# Patient Record
Sex: FEMALE | Race: BLACK OR AFRICAN AMERICAN | NOT HISPANIC OR LATINO | RURAL
[De-identification: names, ages, dates, MRNs, and addresses within clinical notes are randomized per-mention and may not be internally consistent; named-entity substitution may affect disease eponyms.]

---

## 2018-05-26 ENCOUNTER — OFFICE VISIT (OUTPATIENT)
Dept: RETAIL CLINIC | Facility: CLINIC | Age: 33
End: 2018-05-26

## 2018-05-26 VITALS
WEIGHT: 293 LBS | HEART RATE: 111 BPM | HEIGHT: 70 IN | RESPIRATION RATE: 14 BRPM | TEMPERATURE: 97.3 F | BODY MASS INDEX: 41.95 KG/M2 | OXYGEN SATURATION: 99 %

## 2018-05-26 DIAGNOSIS — J06.9 VIRAL UPPER RESPIRATORY TRACT INFECTION: Primary | ICD-10-CM

## 2018-05-26 PROCEDURE — 99203 OFFICE O/P NEW LOW 30 MIN: CPT | Performed by: NURSE PRACTITIONER

## 2018-05-26 RX ORDER — IBUPROFEN 800 MG/1
800 TABLET ORAL EVERY 6 HOURS PRN
Qty: 30 TABLET | Refills: 0 | Status: SHIPPED | OUTPATIENT
Start: 2018-05-26 | End: 2021-09-01

## 2018-05-26 RX ORDER — LISINOPRIL AND HYDROCHLOROTHIAZIDE 20; 12.5 MG/1; MG/1
1 TABLET ORAL 2 TIMES DAILY
COMMUNITY
End: 2023-03-07

## 2018-05-26 RX ORDER — PSEUDOEPHEDRINE HCL 120 MG/1
120 TABLET, FILM COATED, EXTENDED RELEASE ORAL EVERY 12 HOURS
Qty: 30 TABLET | Refills: 0 | Status: SHIPPED | OUTPATIENT
Start: 2018-05-26 | End: 2021-09-01

## 2018-05-26 RX ORDER — DEXTROMETHORPHAN HYDROBROMIDE AND PROMETHAZINE HYDROCHLORIDE 15; 6.25 MG/5ML; MG/5ML
5 SYRUP ORAL 4 TIMES DAILY PRN
Qty: 240 ML | Refills: 0 | Status: SHIPPED | OUTPATIENT
Start: 2018-05-26 | End: 2018-06-02

## 2018-05-26 NOTE — PROGRESS NOTES
Subjective   Jailyn Jalloh is a 32 y.o. female.   Chief Complaint   Patient presents with   • Cough   • Ear Drainage   • Sore Throat      31 yo female presents with complaint of throat raw and aching, right ear ache, and cough duration of 2 days.  No fever.  See ros for additional information.      Cough   This is a new problem. The current episode started yesterday. The problem has been gradually worsening. The problem occurs constantly. The cough is non-productive. Associated symptoms include ear pain, postnasal drip and a sore throat. Pertinent negatives include no chills, fever, rhinorrhea, shortness of breath or wheezing. Exacerbated by: exertion. She has tried nothing for the symptoms. The treatment provided no relief.        The following portions of the patient's history were reviewed and updated as appropriate: allergies, current medications, past family history, past medical history, past social history, past surgical history and problem list.    Current Outpatient Prescriptions:   •  lisinopril-hydrochlorothiazide (PRINZIDE,ZESTORETIC) 20-12.5 MG per tablet, Take 1 tablet by mouth Daily., Disp: , Rfl:   •  ibuprofen (ADVIL,MOTRIN) 800 MG tablet, Take 1 tablet by mouth Every 6 (Six) Hours As Needed for Moderate Pain , Fever or Headache., Disp: 30 tablet, Rfl: 0  •  promethazine-dextromethorphan (PROMETHAZINE-DM) 6.25-15 MG/5ML syrup, Take 5 mL by mouth 4 (Four) Times a Day As Needed for Cough for up to 7 days., Disp: 240 mL, Rfl: 0  •  pseudoephedrine (SUDAFED 12 HOUR) 120 MG 12 hr tablet, Take 1 tablet by mouth Every 12 (Twelve) Hours., Disp: 30 tablet, Rfl: 0    Review of Systems   Constitutional: Positive for activity change and fatigue. Negative for appetite change, chills and fever.   HENT: Positive for ear pain, postnasal drip and sore throat. Negative for congestion, ear discharge, rhinorrhea, sinus pain, sinus pressure and sneezing.    Eyes: Negative.    Respiratory: Positive for cough.  "Negative for apnea, choking, chest tightness, shortness of breath, wheezing and stridor.    Cardiovascular: Negative.    Musculoskeletal: Negative.    Skin: Negative.    Psychiatric/Behavioral: Negative.      Pulse 111   Temp 97.3 °F (36.3 °C) (Temporal Artery )   Resp 14   Ht 177.8 cm (70\")   Wt (!) 137 kg (301 lb)   LMP 04/30/2018   SpO2 99%   BMI 43.19 kg/m²     Objective   No Known Allergies    Physical Exam   Constitutional: She is oriented to person, place, and time. She appears well-developed and well-nourished. No distress.   HENT:   Head: Normocephalic.   Right Ear: External ear normal. Tympanic membrane is scarred and bulging. Tympanic membrane is not erythematous. A middle ear effusion is present.   Left Ear: Hearing, external ear and ear canal normal. Tympanic membrane is scarred.   Nose: Mucosal edema present. Right sinus exhibits no maxillary sinus tenderness and no frontal sinus tenderness. Left sinus exhibits no maxillary sinus tenderness and no frontal sinus tenderness.   Mouth/Throat: Uvula is midline, oropharynx is clear and moist and mucous membranes are normal. Tonsils are 0 on the right. Tonsils are 0 on the left. No tonsillar exudate.   Eyes: Conjunctivae are normal.   Neck: Normal range of motion. Neck supple. No JVD present. No tracheal deviation present. No thyromegaly present.   Cardiovascular: Normal rate, regular rhythm and normal heart sounds.    Pulmonary/Chest: Effort normal and breath sounds normal. No stridor. No respiratory distress. She has no wheezes. She has no rales. She exhibits no tenderness.   Lymphadenopathy:     She has no cervical adenopathy.   Neurological: She is alert and oriented to person, place, and time.   Skin: Skin is warm. Capillary refill takes less than 2 seconds. No rash noted. She is not diaphoretic. No erythema. No pallor.   Psychiatric: She has a normal mood and affect. Her behavior is normal.   Vitals reviewed.      Assessment/Plan   Jailyn was " seen today for cough, ear drainage and sore throat.    Diagnoses and all orders for this visit:    Viral upper respiratory tract infection    Other orders  -     pseudoephedrine (SUDAFED 12 HOUR) 120 MG 12 hr tablet; Take 1 tablet by mouth Every 12 (Twelve) Hours.  -     ibuprofen (ADVIL,MOTRIN) 800 MG tablet; Take 1 tablet by mouth Every 6 (Six) Hours As Needed for Moderate Pain , Fever or Headache.  -     promethazine-dextromethorphan (PROMETHAZINE-DM) 6.25-15 MG/5ML syrup; Take 5 mL by mouth 4 (Four) Times a Day As Needed for Cough for up to 7 days.           An After Visit Summary was printed, reviewed, and given to the patient. Understanding verbalized and agrees with treatment plan.  If no improvement or becomes worse, follow up with primary or go to [facility] [place].          May 26, 2018 11:47 AM

## 2021-09-01 ENCOUNTER — OFFICE VISIT (OUTPATIENT)
Dept: OBSTETRICS AND GYNECOLOGY | Facility: CLINIC | Age: 36
End: 2021-09-01

## 2021-09-01 VITALS
BODY MASS INDEX: 41.02 KG/M2 | DIASTOLIC BLOOD PRESSURE: 80 MMHG | WEIGHT: 293 LBS | SYSTOLIC BLOOD PRESSURE: 132 MMHG | HEIGHT: 71 IN

## 2021-09-01 DIAGNOSIS — R82.90 BAD ODOR OF URINE: ICD-10-CM

## 2021-09-01 DIAGNOSIS — Z01.419 ENCOUNTER FOR GYNECOLOGICAL EXAMINATION: ICD-10-CM

## 2021-09-01 DIAGNOSIS — D25.9 UTERINE LEIOMYOMA, UNSPECIFIED LOCATION: Primary | ICD-10-CM

## 2021-09-01 LAB
BILIRUB BLD-MCNC: NEGATIVE MG/DL
GLUCOSE UR STRIP-MCNC: NEGATIVE MG/DL
KETONES UR QL: NEGATIVE
LEUKOCYTE EST, POC: NEGATIVE
NITRITE UR-MCNC: NEGATIVE MG/ML
PH UR: 6 [PH] (ref 5–8)
PROT UR STRIP-MCNC: NEGATIVE MG/DL
RBC # UR STRIP: NEGATIVE /UL
SP GR UR: 1.02 (ref 1–1.03)
UROBILINOGEN UR QL: NORMAL

## 2021-09-01 PROCEDURE — 99385 PREV VISIT NEW AGE 18-39: CPT | Performed by: NURSE PRACTITIONER

## 2021-09-01 PROCEDURE — 81002 URINALYSIS NONAUTO W/O SCOPE: CPT | Performed by: NURSE PRACTITIONER

## 2021-09-01 RX ORDER — FERROUS SULFATE 325(65) MG
TABLET ORAL
COMMUNITY
Start: 2021-08-22

## 2021-09-01 RX ORDER — OMEPRAZOLE 20 MG/1
20 CAPSULE, DELAYED RELEASE ORAL DAILY
COMMUNITY
End: 2023-02-22

## 2021-09-01 RX ORDER — ERGOCALCIFEROL 1.25 MG/1
50000 CAPSULE ORAL
COMMUNITY
Start: 2021-08-22

## 2021-09-01 NOTE — PROGRESS NOTES
GYN Annual Exam     CC - Here for annual exam.        HPI  Jailyn Jalloh is a 36 y.o. female, , who presents for annual well woman exam. Patient's last menstrual period was 2021..  Periods are regular every 25-35 days, lasting 4 days. .  Dysmenorrhea:severe, occurring first 1-2 days of flow.  Patient reports problems with: none.  Pt reports urine having a bad odor. Partner Status: Marital Status: single.  She is sexually active. No concerns of STD's.  Hx fibroids; US 2020= 1 cm and 2 cm    Additional OB/GYN History   Current contraception: contraceptive methods: Tubal ligation    Last Pap :   Last Completed Pap Smear          Ordered - PAP SMEAR (Every 3 Years) Ordered on 2020  Done - in [place] (negative)              History of abnormal Pap smear: no  Family history of uterine, colon, breast, or ovarian cancer: no  Performs monthly Self-Breast Exam: yes  Exercises Regularly:no  Feelings of Anxiety or Depression: no  Tobacco Usage?: No   OB History        2    Para   2    Term   2            AB        Living   2       SAB        TAB        Ectopic        Molar        Multiple        Live Births   2                Health Maintenance   Topic Date Due   • Annual Gynecologic Pelvic and Breast Exam  Never done   • ANNUAL PHYSICAL  Never done   • COVID-19 Vaccine (1) Never done   • HEPATITIS C SCREENING  Never done   • INFLUENZA VACCINE  10/01/2021   • PAP SMEAR  2023   • TDAP/TD VACCINES (4 - Td or Tdap) 2029   • Pneumococcal Vaccine 0-64  Aged Out       The additional following portions of the patient's history were reviewed and updated as appropriate: allergies, current medications, past family history, past medical history, past social history, past surgical history and problem list.    Review of Systems   All other systems reviewed and are negative.        I have reviewed and agree with the HPI, ROS, and historical information as entered above. Naheed Ferris  "Tahir, APRN    Objective   /80 (BP Location: Left arm, Patient Position: Sitting, Cuff Size: Large Adult)   Ht 180.3 cm (71\")   Wt (!) 140 kg (308 lb)   LMP 08/13/2021   Breastfeeding No   BMI 42.96 kg/m²     Physical Exam  Vitals and nursing note reviewed. Exam conducted with a chaperone present.   Constitutional:       General: She is not in acute distress.     Appearance: Normal appearance. She is well-developed. She is not ill-appearing.   HENT:      Head: Normocephalic and atraumatic.   Neck:      Thyroid: No thyroid mass or thyromegaly.   Cardiovascular:      Heart sounds: No murmur heard.     Pulmonary:      Effort: Pulmonary effort is normal. No retractions.   Chest:      Chest wall: No mass.      Breasts:         Right: Normal. No mass, nipple discharge, skin change or tenderness.         Left: Normal. No mass, nipple discharge, skin change or tenderness.   Abdominal:      Palpations: Abdomen is soft. Abdomen is not rigid. There is no mass.      Tenderness: There is no abdominal tenderness. There is no guarding.      Hernia: No hernia is present. There is no hernia in the left inguinal area.   Genitourinary:     General: Normal vulva.      Labia:         Right: No rash, tenderness or lesion.         Left: No rash, tenderness or lesion.       Vagina: Normal. No vaginal discharge or lesions.      Cervix: Normal.      Uterus: Normal. Not enlarged, not fixed and not tender.       Adnexa: Right adnexa normal and left adnexa normal.        Right: No mass or tenderness.          Left: No mass or tenderness.        Rectum: No external hemorrhoid.   Musculoskeletal:      Cervical back: Normal range of motion. No muscular tenderness.   Skin:     General: Skin is warm and dry.   Neurological:      Mental Status: She is alert and oriented to person, place, and time.   Psychiatric:         Mood and Affect: Mood normal.         Behavior: Behavior normal.            Assessment and Plan    Problem List Items " Addressed This Visit     None      Visit Diagnoses     Uterine leiomyoma, unspecified location    -  Primary    Relevant Orders    US Non-ob Transvaginal    Bad odor of urine        Relevant Orders    POC Urinalysis Dipstick (Completed)    Encounter for gynecological examination        Relevant Orders    Pap IG, HPV-hr          1. GYN annual well woman exam.   2. Reviewed monthly self breast exams.  Instructed to call with lumps, pain, or breast discharge.    3. Reviewed exercise as a preventative health measures.   4. Reccommended Flu Vaccine in Fall of each year.  5. RTC in 1 year or PRN with problems  Return in about 1 year (around 9/1/2022) for Annual physical.   7.   US today shows 2 stable fibroids at 1 cm and 2 cm.      Naheed Haro, APRN  09/01/2021

## 2023-02-22 ENCOUNTER — OFFICE VISIT (OUTPATIENT)
Dept: OBSTETRICS AND GYNECOLOGY | Facility: CLINIC | Age: 38
End: 2023-02-22
Payer: COMMERCIAL

## 2023-02-22 VITALS
WEIGHT: 293 LBS | SYSTOLIC BLOOD PRESSURE: 126 MMHG | HEIGHT: 71 IN | BODY MASS INDEX: 41.02 KG/M2 | DIASTOLIC BLOOD PRESSURE: 84 MMHG

## 2023-02-22 DIAGNOSIS — D25.9 UTERINE LEIOMYOMA, UNSPECIFIED LOCATION: ICD-10-CM

## 2023-02-22 DIAGNOSIS — Z01.419 WOMEN'S ANNUAL ROUTINE GYNECOLOGICAL EXAMINATION: Primary | ICD-10-CM

## 2023-02-22 PROCEDURE — 99395 PREV VISIT EST AGE 18-39: CPT | Performed by: NURSE PRACTITIONER

## 2023-02-22 NOTE — PROGRESS NOTES
Gynecologic Annual Exam Note        Gynecologic Exam        Subjective     HPI  Jailyn Jalloh is a 37 y.o.  female who presents for annual well woman exam as a established patient. Since her last visit the patient underwent surgery for gallbladder removal . Patient reports problems with: none. Patient's last menstrual period was 2023 (exact date).. Her periods occur every 25-35 days , lasting 4 days. The flow is moderate to heavy .. She reports dysmenorrhea is moderate, occurring first 1-2 days of flow. Patient reports clots and from going to 2022 til 2022 without a period but had cramps.  Partner Status: Marital Status: single.  She is sexually active. She has not had new partners.. STD testing recommendations have been explained to the patient and she does not desire STD testing.    Hx of uterine fibroids @ 1 and 2 cm which were stable from US in  to .  No changes since.    Additional OB/GYN History   Current contraception: contraceptive methods: Tubal ligation    Thromboembolic Disease: none  Age of menarche: 10    History of STD: HPV and Genital warts in      Last Pap :2021. Results: negative. HPV: negative.   Last Completed Pap Smear          PAP SMEAR (Every 3 Years) Next due on 2021  Pap IG, HPV-hr    2020  Done - in [place] (negative)                 History of abnormal Pap smear: no  Gardasil status:has not had   Family history of uterine, colon, breast, or ovarian cancer: no  Performs monthly Self-Breast Exam: yes  Exercises Regularly:occassional   Feelings of Anxiety or Depression: no  Tobacco Usage?: No       Current Outpatient Medications:   •  FeroSul 325 (65 Fe) MG tablet, TAKE 1 TABLET BY MOUTH EVERY DAY WITH VITAMIN TABLET -500 MG, Disp: , Rfl:   •  lisinopril-hydrochlorothiazide (PRINZIDE,ZESTORETIC) 20-12.5 MG per tablet, Take 1 tablet by mouth Daily., Disp: , Rfl:   •  vitamin D (ERGOCALCIFEROL) 1.25 MG  "(19289 UT) capsule capsule, Take 50,000 Units by mouth Every 7 (Seven) Days., Disp: , Rfl:      Patient denies the need for medication refills today.    OB History        2    Para   2    Term   2            AB        Living   2       SAB        IAB        Ectopic        Molar        Multiple        Live Births   2                Health Maintenance   Topic Date Due   • COVID-19 Vaccine (1) Never done   • HEPATITIS C SCREENING  Never done   • ANNUAL PHYSICAL  Never done   • INFLUENZA VACCINE  2022   • Annual Gynecologic Pelvic and Breast Exam  2022   • PAP SMEAR  2024   • TDAP/TD VACCINES (4 - Td or Tdap) 2029   • Pneumococcal Vaccine 0-64  Aged Out       Past Medical History:   Diagnosis Date   • Gallstones    • GERD (gastroesophageal reflux disease)    • Hypertension    • Iron deficiency anemia         Past Surgical History:   Procedure Laterality Date   •  SECTION     • GALLBLADDER SURGERY      10/21   • LAPAROSCOPIC TUBAL LIGATION     • TONSILLECTOMY         The additional following portions of the patient's history were reviewed and updated as appropriate: allergies, current medications, past family history, past medical history, past social history, past surgical history and problem list.    Review of Systems   All other systems reviewed and are negative.        I have reviewed and agree with the HPI, ROS, and historical information as entered above. Naheed Haro, APRN        Objective   /84   Ht 180.3 cm (71\")   Wt (!) 147 kg (324 lb 9.6 oz)   LMP 2023 (Exact Date)   BMI 45.27 kg/m²     Physical Exam  Vitals and nursing note reviewed. Exam conducted with a chaperone present.   Constitutional:       General: She is not in acute distress.     Appearance: Normal appearance. She is well-developed. She is obese. She is not ill-appearing.   HENT:      Head: Normocephalic and atraumatic.   Neck:      Thyroid: No thyroid mass or thyromegaly.   Pulmonary: "      Effort: Pulmonary effort is normal. No respiratory distress or retractions.   Chest:      Chest wall: No mass.   Breasts:     Right: Normal. No mass, nipple discharge, skin change or tenderness.      Left: Normal. No mass, nipple discharge, skin change or tenderness.   Abdominal:      Palpations: Abdomen is soft. Abdomen is not rigid. There is no mass.      Tenderness: There is no abdominal tenderness. There is no guarding.      Hernia: No hernia is present. There is no hernia in the left inguinal area.   Genitourinary:     General: Normal vulva.      Labia:         Right: No rash, tenderness or lesion.         Left: No rash, tenderness or lesion.       Vagina: Normal. No vaginal discharge or lesions.      Cervix: Normal.      Uterus: Normal. Not enlarged, not fixed and not tender.       Adnexa: Left adnexa normal.        Right: No mass or tenderness.          Left: No mass or tenderness.        Rectum: No external hemorrhoid.   Musculoskeletal:      Cervical back: Normal range of motion. No muscular tenderness.   Skin:     General: Skin is warm and dry.   Neurological:      Mental Status: She is alert and oriented to person, place, and time.   Psychiatric:         Behavior: Behavior normal.            Assessment and Plan    Problem List Items Addressed This Visit    None  Visit Diagnoses     Women's annual routine gynecological examination    -  Primary    Uterine leiomyoma, unspecified location              1. GYN annual well woman exam.   2. Reviewed pap guidelines.   3. Reviewed monthly self breast exams.  Instructed to call with lumps, pain, or breast discharge.    4. Reviewed BMI and weight loss as preventative health measures.   5. Reviewed exercise as a preventative health measures.   6. Reccommended Flu Vaccine in Fall of each year.  7. RTC in 1 year or PRN with problems  Return in about 1 year (around 2/22/2024) for Annual physical.   9.   Will order initial screening mammogram next year.   10. Call  prn pain, AUB for US FU of fibroids.      Naheed Haro, APRN  02/22/2023

## 2023-02-28 PROBLEM — E66.01 MORBID (SEVERE) OBESITY DUE TO EXCESS CALORIES (HCC): Status: ACTIVE | Noted: 2023-02-28

## 2023-02-28 PROBLEM — E01.0 THYROMEGALY: Status: ACTIVE | Noted: 2023-02-28

## 2023-02-28 PROBLEM — I10 PRIMARY HYPERTENSION: Status: ACTIVE | Noted: 2023-02-28

## 2023-02-28 PROBLEM — R94.31 ABNORMAL EKG: Status: ACTIVE | Noted: 2023-02-28

## 2023-02-28 PROBLEM — E55.9 VITAMIN D DEFICIENCY: Status: ACTIVE | Noted: 2023-02-28

## 2023-02-28 PROBLEM — E78.5 DYSLIPIDEMIA: Status: ACTIVE | Noted: 2023-02-28

## 2023-02-28 PROBLEM — D50.9 IRON DEFICIENCY ANEMIA: Status: ACTIVE | Noted: 2023-02-28

## 2023-03-01 ENCOUNTER — OFFICE VISIT (OUTPATIENT)
Dept: FAMILY MEDICINE CLINIC | Facility: CLINIC | Age: 38
End: 2023-03-01
Payer: COMMERCIAL

## 2023-03-01 VITALS
SYSTOLIC BLOOD PRESSURE: 136 MMHG | BODY MASS INDEX: 41.02 KG/M2 | DIASTOLIC BLOOD PRESSURE: 106 MMHG | HEIGHT: 71 IN | WEIGHT: 293 LBS | OXYGEN SATURATION: 99 % | HEART RATE: 86 BPM | TEMPERATURE: 97.8 F

## 2023-03-01 DIAGNOSIS — D50.8 OTHER IRON DEFICIENCY ANEMIA: ICD-10-CM

## 2023-03-01 DIAGNOSIS — I10 PRIMARY HYPERTENSION: Primary | ICD-10-CM

## 2023-03-01 DIAGNOSIS — E55.9 VITAMIN D DEFICIENCY: ICD-10-CM

## 2023-03-01 DIAGNOSIS — E66.01 MORBID (SEVERE) OBESITY DUE TO EXCESS CALORIES: ICD-10-CM

## 2023-03-01 PROCEDURE — 99214 OFFICE O/P EST MOD 30 MIN: CPT | Performed by: INTERNAL MEDICINE

## 2023-03-01 RX ORDER — AMLODIPINE BESYLATE 5 MG/1
5 TABLET ORAL DAILY
Qty: 90 TABLET | Refills: 3 | Status: SHIPPED | OUTPATIENT
Start: 2023-03-01

## 2023-03-01 NOTE — PROGRESS NOTES
Answers for HPI/ROS submitted by the patient on 2023  What is the primary reason for your visit?: Physical        Follow Up Office Visit      Date: 2023   Patient Name: Jailyn Jalloh  : 1985   MRN: [id number]     Chief Complaint:    Chief Complaint   Patient presents with   • Annual Exam     F/u for medication       History of Present Illness: Jailyn Jalloh is a 37 y.o. female who is here today for her first visit is 2022 primarily for review of her hypertension, noting she takes lisinopril/HCTZ 20/12.5 mg twice daily with her blood pressures checked once or twice weekly per her account averaging 170-180/70 90 though she has had blood pressures more in the 140 systolic when she is had medical checkup such as a gynecologist.  She denies headaches dizziness chest pains palpitations dyspnea or edema.  Overall has fairly good energy.  She does have history of iron deficiency anemia which has been felt related to heavy menses, followed regularly by gynecology with no interventions being performed at this time, her menses are regular, lasting 4 to 5 days, known she does have some ovarian cysts but these are felt to be benign.  She does have a BTL for contraception.  She also has a history of vitamin D deficiency on high-dose vitamin D supplementation.  We discussed her obesity, patient indicating that she is started to workout regularly and has made some dietary modifications, but is interested in some assistance with medication to help her lose weight.  She also has inquired as to her eligibility for bariatric surgery through her insurance which is apparently requiring a 6-month documentation of a lifestyle change before authorization of bariatric surgery..    Subjective      Review of Systems:   Review of Systems    I have reviewed the patients family history, social history, past medical history, past surgical history and have updated it as appropriate.     Medications:     Current  "Outpatient Medications:   •  FeroSul 325 (65 Fe) MG tablet, TAKE 1 TABLET BY MOUTH EVERY DAY WITH VITAMIN TABLET -500 MG, Disp: , Rfl:   •  lisinopril-hydrochlorothiazide (PRINZIDE,ZESTORETIC) 20-12.5 MG per tablet, Take 1 tablet by mouth 2 (Two) Times a Day., Disp: , Rfl:   •  vitamin D (ERGOCALCIFEROL) 1.25 MG (47535 UT) capsule capsule, Take 1 capsule by mouth Every 7 (Seven) Days., Disp: , Rfl:   •  amLODIPine (NORVASC) 5 MG tablet, Take 1 tablet by mouth Daily. For blood pressure, Disp: 90 tablet, Rfl: 3  •  Semaglutide-Weight Management 0.25 MG/0.5ML solution auto-injector, Inject 0.25 mg under the skin into the appropriate area as directed Every 7 (Seven) Days. For weight loss, Disp: 2 mL, Rfl: 0    Allergies:   Allergies   Allergen Reactions   • Bactrim [Sulfamethoxazole-Trimethoprim] Rash       Objective     Physical Exam: Please see above  Vital Signs:   Vitals:    03/01/23 1103 03/01/23 1131   BP: 150/98 (!) 136/106   BP Location: Left arm Left arm   Patient Position: Sitting Sitting   Cuff Size: Adult    Pulse: 86    Temp: 97.8 °F (36.6 °C)    TempSrc: Temporal    SpO2: 99%    Weight: (!) 146 kg (322 lb)    Height: 180.3 cm (70.98\")      Body mass index is 44.93 kg/m².  Class 3 Severe Obesity (BMI >=40). Obesity-related health conditions include the following: hypertension. Obesity is unchanged. BMI is is above average; BMI management plan is completed. We discussed low calorie, low carb based diet program, portion control, increasing exercise and pharmacologic options including GLP-1 agonist.       Physical Exam  General: Very pleasant taller statured healthy-appearing 37-year-old female with a BMI 44.9, noting weight down to 2 pounds from a GYN visit several days earlier, but increased 10 pounds from her office visit here 1 year ago  Neck supple with some mild chronic stable symmetric thyromegaly, no nodules or tenderness  Lungs clear  Cardiac regular rate rhythm with no murmurs gallops or rubs, " no dependent edema.  Procedures    Results:   Labs:   No results found for: HGBA1C, CMP, CBCDIFFPANEL, CREAT, TSH     POCT Results (if applicable):   Results for orders placed or performed in visit on 09/01/21   POC Urinalysis Dipstick    Specimen: Urine   Result Value Ref Range    Glucose, UA Negative Negative, 1000 mg/dL (3+) mg/dL    Bilirubin Negative Negative    Ketones, UA Negative Negative    Specific Gravity  1.020 1.005 - 1.030    Blood, UA Negative Negative    pH, Urine 6.0 5.0 - 8.0    Protein, POC Negative Negative mg/dL    Urobilinogen, UA Normal Normal    Leukocytes Negative Negative    Nitrite, UA Negative Negative     Review of testing 5/13/2022:    TSH 3.47, free T4 0.91, both  White count 6.7 with normal differential, H&H 11.7 and 34.3, MCV 90, platelets 300,000, noting previous H&H 11.9 and 34.3 in 3/2021  CMP unremarkable other than chloride 111, albumin 3.3  Total cholesterol 188, triglyceride 120, HDL 56, , TC/HDL ratio 3  Hemoglobin A1c 5.3%  Iron 55, TIBC 390, ferritin 50, all normal  Iron percent saturation 14%, low  B12 708, normal  Folic acid 11.0, normal  Urinalysis normal  Vitamin D 20.4, low      Assessment / Plan      Assessment/Plan:   Diagnoses and all orders for this visit:    1. Primary hypertension (Primary)  -     amLODIPine (NORVASC) 5 MG tablet; Take 1 tablet by mouth Daily. For blood pressure  Dispense: 90 tablet; Refill: 3  Suboptimal control acutely as well as chronically taking lisinopril/HCTZ 20/12.5 mg twice daily.  We discussed aggressive lifestyle changes with diet exercise avoidance of added salt and attempts at weight loss, but also will add amlodipine 5 mg daily, reassessing clinically in 1 month.  Advise if any problems in the interim  2. Morbid (severe) obesity due to excess calories (HCC)  -     Semaglutide-Weight Management 0.25 MG/0.5ML solution auto-injector; Inject 0.25 mg under the skin into the appropriate area as directed Every 7 (Seven) Days. For  weight loss  Dispense: 2 mL; Refill: 0  Longstanding problem, secondary side effect primarily of her hypertension.  Apparently not a candidate for [place] surgery until she goes through a 6-month regimen of lifestyle changes.  She will initiate same with diet and exercise, but you also discussed medication options and she is interested in a trial of a GLP-1 agonist, noting she is an excellent candidate given her high risk of health complications from her obesity, prompting prescription of Wegovy 0.25 mg subcu weekly for 4 weeks at which time we will follow-up and make further titration as tolerated.  Side effect of Wegovy discussed.  3. Other iron deficiency anemia  Previous iron deficiency anemia noted, secondary to heavy menses.  She is on iron supplementation.  We will update pertinent labs when she comes in for complete physical next month.  4. Vitamin D deficiency  On high-dose vitamin D supplementation.  Update level next visit at her complete physical      Follow Up:   Return in about 1 month (around 4/1/2023) for Annual physical.      At [facility], we believe that sharing information builds trust and better relationships. You are receiving this note because you recently visited [facility]. It is possible you will see health information before a provider has talked with you about it. This kind of information can be easy to misunderstand. To help you fully understand what it means for your health, we urge you to discuss this note with your provider.    Pop Rudd MD  [facility]

## 2023-03-02 ENCOUNTER — PATIENT ROUNDING (BHMG ONLY) (OUTPATIENT)
Dept: FAMILY MEDICINE CLINIC | Facility: CLINIC | Age: 38
End: 2023-03-02
Payer: COMMERCIAL

## 2023-03-02 NOTE — PROGRESS NOTES
.A [employer] message has been sent to the patient for patient rounding with [facility].

## 2023-03-07 RX ORDER — LISINOPRIL AND HYDROCHLOROTHIAZIDE 20; 12.5 MG/1; MG/1
TABLET ORAL
Qty: 180 TABLET | Refills: 2 | Status: SHIPPED | OUTPATIENT
Start: 2023-03-07

## 2023-03-07 RX ORDER — LISINOPRIL AND HYDROCHLOROTHIAZIDE 20; 12.5 MG/1; MG/1
TABLET ORAL
Qty: 60 TABLET | Refills: 0 | Status: SHIPPED | OUTPATIENT
Start: 2023-03-07 | End: 2023-03-07

## 2023-03-29 DIAGNOSIS — E66.01 MORBID (SEVERE) OBESITY DUE TO EXCESS CALORIES: ICD-10-CM

## 2023-03-29 NOTE — TELEPHONE ENCOUNTER
Caller: Jailyn Jalloh    Relationship: Self    Best call back number: [phone]    Requested Prescriptions:   Requested Prescriptions     Pending Prescriptions Disp Refills   • Semaglutide-Weight Management 0.25 MG/0.5ML solution auto-injector 2 mL 0     Sig: Inject 0.25 mg under the skin into the appropriate area as directed Every 7 (Seven) Days. For weight loss        Pharmacy where request should be sent: [id number] - [address]  AT [facility] [address] - [phone]  - [phone] FX     Last office visit with prescribing clinician: 3/1/2023   Last telemedicine visit with prescribing clinician: 4/12/2023   Next office visit with prescribing clinician: 4/12/2023     Additional details provided by patient:   PATIENT IS SCHEDULED FOR 04/12/2023 AND WILL RUN OUT OF MEDICATION BEFORE APPOINTMENT WOULD LIKE FOR A REFILL TO BE CALLED INTO THE PHARMACY IS POSSIBLE BEFORE APPOINTMENT     PATIENT WOULD LIKE CALL BACK REGARDING THIS INFORMATION AND TO BE INFORMED       Does the patient have less than a 3 day supply:  [x] Yes  [] No    Would you like a call back once the refill request has been completed: [x] Yes [] No    If the office needs to give you a call back, can they leave a voicemail: [x] Yes [] No    Senthil Banks Rep   03/29/23 14:14 EDT

## 2023-04-26 ENCOUNTER — OFFICE VISIT (OUTPATIENT)
Dept: FAMILY MEDICINE CLINIC | Facility: CLINIC | Age: 38
End: 2023-04-26
Payer: COMMERCIAL

## 2023-04-26 VITALS
WEIGHT: 293 LBS | OXYGEN SATURATION: 98 % | BODY MASS INDEX: 41.02 KG/M2 | TEMPERATURE: 98 F | HEIGHT: 71 IN | DIASTOLIC BLOOD PRESSURE: 86 MMHG | HEART RATE: 101 BPM | SYSTOLIC BLOOD PRESSURE: 116 MMHG

## 2023-04-26 DIAGNOSIS — E55.9 VITAMIN D DEFICIENCY: ICD-10-CM

## 2023-04-26 DIAGNOSIS — E78.5 DYSLIPIDEMIA: ICD-10-CM

## 2023-04-26 DIAGNOSIS — R94.31 ABNORMAL EKG: ICD-10-CM

## 2023-04-26 DIAGNOSIS — Z00.01 ENCOUNTER FOR GENERAL ADULT MEDICAL EXAMINATION WITH ABNORMAL FINDINGS: Primary | ICD-10-CM

## 2023-04-26 DIAGNOSIS — E66.01 MORBID (SEVERE) OBESITY DUE TO EXCESS CALORIES: ICD-10-CM

## 2023-04-26 DIAGNOSIS — Z12.31 ENCOUNTER FOR SCREENING MAMMOGRAM FOR MALIGNANT NEOPLASM OF BREAST: ICD-10-CM

## 2023-04-26 DIAGNOSIS — Z11.59 NEED FOR HEPATITIS C SCREENING TEST: ICD-10-CM

## 2023-04-26 DIAGNOSIS — E01.0 THYROMEGALY: ICD-10-CM

## 2023-04-26 DIAGNOSIS — I10 PRIMARY HYPERTENSION: ICD-10-CM

## 2023-04-26 DIAGNOSIS — D50.8 OTHER IRON DEFICIENCY ANEMIA: ICD-10-CM

## 2023-04-26 PROBLEM — Z92.89 HISTORY OF STRESS TEST: Status: ACTIVE | Noted: 2023-04-26

## 2023-04-26 RX ORDER — SEMAGLUTIDE 0.5 MG/.5ML
0.5 INJECTION, SOLUTION SUBCUTANEOUS
Qty: 2 ML | Refills: 0 | Status: SHIPPED | OUTPATIENT
Start: 2023-04-26

## 2023-04-26 NOTE — PROGRESS NOTES
Venipuncture Blood Specimen Collection  Venipuncture performed in left hand by La Nena Hurley MA with good hemostasis. Patient tolerated the procedure well without complications.   04/26/23   La Nena Hurley MA

## 2023-04-26 NOTE — PROGRESS NOTES
Female Physical Note      Date: 2023   Patient Name: Jailyn Jalloh  : 1985   MRN: [id number]     Chief Complaint:    Chief Complaint   Patient presents with   • Annual Exam       History of Present Illness: Jailyn Jalloh is a 37 y.o. female who is here today for their annual health maintenance and physical.  Patient was last seen in the office almost 2 months ago having been prescribed Wegovy 0.25 mg subcu weekly which she is taking for 8 weeks, noting a reduction in her appetite with no GI or other known side effects, having made significant lifestyle changes working out at the [facility] 3-4 times weekly on an ellipWOWash machine for 1 hour making dietary modifications eliminating sweet drinks junk foods and fast foods and drinking water with plenty of fruits and vegetables.  She has lost 13 pounds in weight in the last 2 months secondarily.  Also last visit had the addition of amlodipine 5 mg daily to her lisinopril/HCTZ 20/12.5 mg twice daily, not checking blood pressures but having no headaches dizziness or cardiopulmonary complaints.  She has a history of vitamin D deficiency on high-dose vitamin D supplementation weekly, also history of previous thyromegaly with normal ultrasound and normal TFTs and normal antithyroglobulin antibody in , having had a thyroid ultrasound in 3/2019 that revealed a tiny colloid cyst in the left lobe otherwise was unremarkable.  She is not aware of any increase in size of her thyroid.  Has a history of iron deficiency anemia felt secondary to heavy menses on iron supplementation, history of an abnormal EKG with some inferior lateral T wave flattening having undergone a normal stress echocardiogram in the last couple years.  Again no chest pains palpitations dyspnea or edema.  History of normal Pap smear and HPV screen from 2021 indicating she saw her gynecologist again in 2023 and is quite certain another Pap smear was obtained though she is unsure of  the results.  No other acute problems or concerns      Subjective      Review of Systems:   Review of Systems    Past Medical History, Social History, Family History and Care Team were all reviewed with patient and updated as appropriate.     Medications:     Current Outpatient Medications:   •  amLODIPine (NORVASC) 5 MG tablet, Take 1 tablet by mouth Daily. For blood pressure, Disp: 90 tablet, Rfl: 3  •  FeroSul 325 (65 Fe) MG tablet, TAKE 1 TABLET BY MOUTH EVERY DAY WITH VITAMIN TABLET -500 MG, Disp: , Rfl:   •  lisinopril-hydrochlorothiazide (PRINZIDE,ZESTORETIC) 20-12.5 MG per tablet, TAKE 1 TABLET BY MOUTH TWICE DAILY, Disp: 180 tablet, Rfl: 2  •  vitamin D (ERGOCALCIFEROL) 1.25 MG (00358 UT) capsule capsule, Take 1 capsule by mouth Every 7 (Seven) Days., Disp: , Rfl:   •  Semaglutide-Weight Management (Wegovy) 0.5 MG/0.5ML solution auto-injector, Inject 0.5 mL under the skin into the appropriate area as directed Every 7 (Seven) Days., Disp: 2 mL, Rfl: 0    Allergies:   Allergies   Allergen Reactions   • Bactrim [Sulfamethoxazole-Trimethoprim] Rash       Immunizations:  Health Maintenance Summary          Ordered - HEPATITIS C SCREENING (Once) Ordered on 4/26/2023    No completion, postpone, or frequency change history exists for this topic.          Postponed - COVID-19 Vaccine (5 - Booster) Postponed until 10/2/2023    04/26/2023  Postponed until 10/2/2023 by Penny Kumar (Pending event)    04/30/2022  Imm Admin: COVID-19 (UNSPECIFIED)    01/15/2022  Imm Admin: COVID-19 (UNSPECIFIED)    04/27/2021  Imm Admin: COVID-19 (UNSPECIFIED)    04/06/2021  Imm Admin: COVID-19 (UNSPECIFIED)          INFLUENZA VACCINE (Yearly - August to March) Next due on 8/1/2023    10/19/2020  Imm Admin: Influenza, Unspecified    11/29/2019  Imm Admin: Influenza, Unspecified    10/09/2019  Imm Admin: Influenza, Unspecified    11/12/2018  Imm Admin: Influenza, Unspecified    11/17/2017  Imm Admin: Influenza, Unspecified     Only the first 5 history entries have been loaded, but more history exists.          ANNUAL PHYSICAL (Yearly) Next due on 4/26/2024 04/26/2023  Done    03/07/2019  Done          PAP SMEAR (Every 3 Years) Next due on 9/7/2024 09/07/2021  Pap IG, HPV-hr    08/26/2020  Done - in [place] (negative)          TDAP/TD VACCINES (4 - Td or Tdap) Next due on 3/7/2029    03/07/2019  Imm Admin: Tdap    11/15/2013  Imm Admin: Tdap    10/01/1998  Imm Admin: Td          Pneumococcal Vaccine 0-64 (Series Information) Aged Out    No completion, postpone, or frequency change history exists for this topic.                 No orders of the defined types were placed in this encounter.       Colorectal Screening:   N/A  Last Completed Colonoscopy     This patient has no relevant Health Maintenance data.        Pap: 2/2023 reported by patient, normal study in 9/2021 with negative HPV  Last Completed Pap Smear          PAP SMEAR (Every 3 Years) Next due on 9/7/2024 09/07/2021  Pap IG, HPV-hr    08/26/2020  Done - in [place] (negative)               Mammogram: To start at age 40  Last Completed Mammogram     This patient has no relevant Health Maintenance data.           CT for Smoker (Age 50-80, 20 pk yr):   N/A  Bone Density/DEXA (Age 65 or high risk): N/A  Hep C (Age 18-79 once): Pending  HIV (Age 15-65 once): No results found for: HIV1X2 N/A  A1c: No results found for: HGBA1C pending  Lipid panel:  No results found for: LIPIDEXCLUSI pending    The ASCVD Risk score (Shiraz FUENTES, et al., 2019) failed to calculate for the following reasons:    The 2019 ASCVD risk score is only valid for ages 40 to 79    Dermatology: N/A  Ophthalmologist: N/A  Dentist: Regular checkups with appropriate dental hygiene    Tobacco Use: Medium Risk   • Smoking Tobacco Use: Former   • Smokeless Tobacco Use: Never   • Passive Exposure: Not on file       Social History     Substance and Sexual Activity   Alcohol Use Yes    Comment: social        Social  "History     Substance and Sexual Activity   Drug Use Never        Diet/Physical activity: Healthy diet, regular physical activity,    Sexual Health: Does use contraception status post bilateral BTL, not attempting pregnancy   Menopause: N/A  Menstrual Cycles: Regular, last menstrual cycle: Within the last month    Depression: PHQ-2 Depression Screening  PHQ-9 Total Score: 0       Objective     Physical Exam:  Vital Signs:   Vitals:    04/26/23 1131   BP: 116/86   BP Location: Left arm   Patient Position: Sitting   Cuff Size: Adult   Pulse: 101   Temp: 98 °F (36.7 °C)   TempSrc: Temporal   SpO2: 98%   Weight: (!) 141 kg (309 lb 12.8 oz)   Height: 180.3 cm (71\")     Body mass index is 43.21 kg/m².     Physical Exam  Vitals and nursing note reviewed.   Constitutional:       Appearance: Normal appearance. She is obese.      Comments: Taller statured obese 37-year-old female, weight down 13 pounds in the last 2 months, alert and oriented, well-groomed, well spoken   HENT:      Head: Normocephalic and atraumatic.      Right Ear: Tympanic membrane, ear canal and external ear normal.      Left Ear: Tympanic membrane, ear canal and external ear normal.      Nose: Nose normal.      Mouth/Throat:      Mouth: Mucous membranes are moist.      Pharynx: Oropharynx is clear.      Comments: Good dentition  Eyes:      Extraocular Movements: Extraocular movements intact.      Conjunctiva/sclera: Conjunctivae normal.      Pupils: Pupils are equal, round, and reactive to light.   Neck:      Vascular: No carotid bruit.      Comments: Moderate smooth symmetric thyromegaly stable in size, no nodules or tenderness, no cervical, periclavicular, axillary or inguinal adenopathy  Cardiovascular:      Rate and Rhythm: Normal rate and regular rhythm.      Pulses: Normal pulses.      Heart sounds: Normal heart sounds. No murmur heard.    No friction rub. No gallop.      Comments: 2+ carotids without bruits, 2+ radial pulses, 2+ femoral pulses " without bruits, 2+ bipedal pulses with good perfusion and no edema  Pulmonary:      Effort: Pulmonary effort is normal.      Breath sounds: Normal breath sounds.      Comments: No cough wheeze or dyspnea  Chest:      Comments: Exam deferred given routinely performed by gynecology with no acute concern  Abdominal:      General: Bowel sounds are normal.      Palpations: Abdomen is soft.      Comments: Significant centripetal obesity, nontender nondistended with no organomegaly or masses   Genitourinary:     Comments: Pelvic/ exam deferred as routinely performed by gynecology with no acute concerns  Musculoskeletal:         General: No swelling, tenderness or deformity. Normal range of motion.      Cervical back: Normal range of motion and neck supple. No rigidity or tenderness.      Right lower leg: No edema.      Left lower leg: No edema.   Lymphadenopathy:      Cervical: No cervical adenopathy.   Skin:     General: Skin is warm and dry.      Capillary Refill: Capillary refill takes less than 2 seconds.      Findings: No lesion or rash.   Neurological:      General: No focal deficit present.      Mental Status: She is alert and oriented to person, place, and time. Mental status is at baseline.      Cranial Nerves: No cranial nerve deficit.      Sensory: No sensory deficit.      Motor: No weakness.      Coordination: Coordination normal.   Psychiatric:         Mood and Affect: Mood normal.         Behavior: Behavior normal.         Thought Content: Thought content normal.         Judgment: Judgment normal.         POCT Results (if applicable);   Results for orders placed or performed in visit on 09/01/21   POC Urinalysis Dipstick    Specimen: Urine   Result Value Ref Range    Glucose, UA Negative Negative, 1000 mg/dL (3+) mg/dL    Bilirubin Negative Negative    Ketones, UA Negative Negative    Specific Gravity  1.020 1.005 - 1.030    Blood, UA Negative Negative    pH, Urine 6.0 5.0 - 8.0    Protein, POC Negative  Negative mg/dL    Urobilinogen, UA Normal Normal    Leukocytes Negative Negative    Nitrite, UA Negative Negative      Review of testing obtained at [facility] 5/13/2022:     TSH 3.47, free T4 0.91, both  White count 6.7 with normal differential, H&H 11.7 and 34.3, MCV 90, platelets 300,000, noting previous H&H 11.9 and 34.3 in 3/2021  CMP unremarkable other than chloride 111, albumin 3.3  Total cholesterol 188, triglyceride 120, HDL 56, , TC/HDL ratio 3  Hemoglobin A1c 5.3%  Iron 55, TIBC 390, ferritin 50, all normal  Iron percent saturation 14%, low  B12 708, normal  Folic acid 11.0, normal  Urinalysis normal  Vitamin D 20.4, low      ECG 12 Lead    Date/Time: 4/26/2023 12:06 PM  Performed by: Pop Rudd MD  Authorized by: Pop Rudd MD   Comparison: compared with previous ECG from 2/10/2022  Similar to previous ECG  Comparison to previous ECG: Normal sinus rhythm rate 94 with mild inferolateral T wave abnormality unchanged versus prior tracing, no abnormalities noted otherwise              Assessment / Plan      Assessment/Plan:   Diagnoses and all orders for this visit:    1. Encounter for general adult medical examination with abnormal findings (Primary)  -     ECG 12 Lead  -     TSH; Future  -     T4, Free; Future  -     CBC & Differential; Future  -     Comprehensive Metabolic Panel; Future  -     Lipid Panel; Future  -     Hemoglobin A1c; Future  -     Hepatitis C Antibody; Future  -     Vitamin D,25-Hydroxy; Future  -     Urinalysis With Culture If Indicated -; Future  -     Iron Profile; Future  -     Ferritin; Future  -     Mammo Screening Bilateral With CAD; Future  Generally healthy 37-year-old black female with history of obesity, having had nice clinical response regarding weight loss, recommended to update her COVID bivalent booster, mammogram to be initiated after age 40, current with gynecology evaluations including Pap smears (will attempt to obtain most recent  result reported by patient from 2/2023), plan initiation of colon cancer screening at routine age of 43.  2. Primary hypertension  -     ECG 12 Lead  -     Comprehensive Metabolic Panel; Future  -     Urinalysis With Culture If Indicated -; Future  Acute blood pressure control improved with the addition of amlodipine 5 mg daily to her lisinopril/HCTZ 20/12.5 mg twice daily.  Initiate regular monitoring at least several times monthly with ideal parameters discussed along with ongoing lifestyle changes with diet exercise and weight loss efforts avoiding added salt.  3. Dyslipidemia  -     Lipid Panel; Future  Update lipid profile.  4. Vitamin D deficiency  -     Vitamin D,25-Hydroxy; Future  On high-dose vitamin D 50,000 IU weekly.  Update level.  5. Other iron deficiency anemia  -     CBC & Differential; Future  -     Iron Profile; Future  -     Ferritin; Future  Most recent hemoglobin 11.7 with history of iron deficiency on supplementation.  Iron deficiency is felt likely secondary to heavy menses.  Update testing  6. Thyromegaly  -     TSH; Future  -     T4, Free; Future  History of thyroid ultrasound from 3/2019 revealing a tiny left lobe colloid cyst unremarkable.  Previous thyroglobulin level normal.  Update thyroid function studies.  7. Morbid (severe) obesity due to excess calories  -     Hemoglobin A1c; Future  -     Semaglutide-Weight Management (Wegovy) 0.5 MG/0.5ML solution auto-injector; Inject 0.5 mL under the skin into the appropriate area as directed Every 7 (Seven) Days.  Dispense: 2 mL; Refill: 0  Has had a nice clinical response with 13 pound weight loss on Wegovy 0.25 mg subcu weekly over the last 2 months, in addition to initiation of lifestyle changes with diet and regular exercise.  Plan increase Wegovy up to 0.5 mg subcu weekly reassessing clinically in 1 month, with plan to further titrate up per protocol assuming tolerating.  8. Abnormal EKG  -     ECG 12 Lead  Mild inferolateral T wave  changes unchanged versus prior tracings, having had normal stress echocardiogram 2 years ago with Dr. Mosher.  Continue risk factor modification  9. Need for hepatitis C screening test  -     Hepatitis C Antibody; Future    10. Encounter for screening mammogram for malignant neoplasm of breast  -     Mammo Screening Bilateral With CAD; Future  Obtain initial screening mammogram after she turns 38 years of age in 7/2023.       Healthcare Maintenance:  Counseling provided based on age appropriate USPSTF guidelines.  Class 3 Severe Obesity (BMI >=40). Obesity-related health conditions include the following: hypertension and dyslipidemias. Obesity is improving with treatment. BMI is is above average; BMI management plan is completed. We discussed portion control, increasing exercise and Wegovy therapy.    Jailyn Shubham voices understanding and acceptance of this advice and will call back with any further questions or concerns. AVS with preventive healthcare tips printed for patient.     Follow Up:   Return in about 1 month (around 5/26/2023) for Recheck.    At [facility], we believe that sharing information builds trust and better relationships. You are receiving this note because you recently visited [facility]. It is possible you will see health information before a provider has talked with you about it. This kind of information can be easy to misunderstand. To help you fully understand what it means for your health, we urge you to discuss this note with your provider.    Pop Rudd MD  [facility] [place]

## 2023-04-27 LAB
25(OH)D3+25(OH)D2 SERPL-MCNC: 23.8 NG/ML (ref 30–100)
ALBUMIN SERPL-MCNC: 4.4 G/DL (ref 3.8–4.8)
ALBUMIN/GLOB SERPL: 1.6 {RATIO} (ref 1.2–2.2)
ALP SERPL-CCNC: 78 IU/L (ref 44–121)
ALT SERPL-CCNC: 29 IU/L (ref 0–32)
AST SERPL-CCNC: 26 IU/L (ref 0–40)
BASOPHILS # BLD AUTO: 0 X10E3/UL (ref 0–0.2)
BASOPHILS NFR BLD AUTO: 1 %
BILIRUB SERPL-MCNC: 0.8 MG/DL (ref 0–1.2)
BUN SERPL-MCNC: 9 MG/DL (ref 6–20)
BUN/CREAT SERPL: 15 (ref 9–23)
CALCIUM SERPL-MCNC: 9.5 MG/DL (ref 8.7–10.2)
CHLORIDE SERPL-SCNC: 103 MMOL/L (ref 96–106)
CHOLEST SERPL-MCNC: 165 MG/DL (ref 100–199)
CO2 SERPL-SCNC: 18 MMOL/L (ref 20–29)
CREAT SERPL-MCNC: 0.6 MG/DL (ref 0.57–1)
EGFRCR SERPLBLD CKD-EPI 2021: 117 ML/MIN/1.73
EOSINOPHIL # BLD AUTO: 0.1 X10E3/UL (ref 0–0.4)
EOSINOPHIL NFR BLD AUTO: 1 %
ERYTHROCYTE [DISTWIDTH] IN BLOOD BY AUTOMATED COUNT: 13 % (ref 11.7–15.4)
FERRITIN SERPL-MCNC: 91 NG/ML (ref 15–150)
GLOBULIN SER CALC-MCNC: 2.7 G/DL (ref 1.5–4.5)
GLUCOSE SERPL-MCNC: 84 MG/DL (ref 70–99)
HBA1C MFR BLD: 5.1 % (ref 4.8–5.6)
HCT VFR BLD AUTO: 36.7 % (ref 34–46.6)
HCV IGG SERPL QL IA: NON REACTIVE
HDLC SERPL-MCNC: 49 MG/DL
HGB BLD-MCNC: 12.5 G/DL (ref 11.1–15.9)
IMM GRANULOCYTES # BLD AUTO: 0 X10E3/UL (ref 0–0.1)
IMM GRANULOCYTES NFR BLD AUTO: 0 %
IRON SATN MFR SERPL: 29 % (ref 15–55)
IRON SERPL-MCNC: 116 UG/DL (ref 27–159)
LDLC SERPL CALC-MCNC: 96 MG/DL (ref 0–99)
LYMPHOCYTES # BLD AUTO: 2.7 X10E3/UL (ref 0.7–3.1)
LYMPHOCYTES NFR BLD AUTO: 43 %
MCH RBC QN AUTO: 30.6 PG (ref 26.6–33)
MCHC RBC AUTO-ENTMCNC: 34.1 G/DL (ref 31.5–35.7)
MCV RBC AUTO: 90 FL (ref 79–97)
MONOCYTES # BLD AUTO: 0.3 X10E3/UL (ref 0.1–0.9)
MONOCYTES NFR BLD AUTO: 5 %
NEUTROPHILS # BLD AUTO: 3.2 X10E3/UL (ref 1.4–7)
NEUTROPHILS NFR BLD AUTO: 50 %
PLATELET # BLD AUTO: 367 X10E3/UL (ref 150–450)
POTASSIUM SERPL-SCNC: 3.9 MMOL/L (ref 3.5–5.2)
PROT SERPL-MCNC: 7.1 G/DL (ref 6–8.5)
RBC # BLD AUTO: 4.09 X10E6/UL (ref 3.77–5.28)
SODIUM SERPL-SCNC: 138 MMOL/L (ref 134–144)
T4 FREE SERPL-MCNC: 1.18 NG/DL (ref 0.82–1.77)
TIBC SERPL-MCNC: 400 UG/DL (ref 250–450)
TRIGL SERPL-MCNC: 110 MG/DL (ref 0–149)
TSH SERPL DL<=0.005 MIU/L-ACNC: 1.6 UIU/ML (ref 0.45–4.5)
UIBC SERPL-MCNC: 284 UG/DL (ref 131–425)
VLDLC SERPL CALC-MCNC: 20 MG/DL (ref 5–40)
WBC # BLD AUTO: 6.4 X10E3/UL (ref 3.4–10.8)

## 2023-04-30 LAB
APPEARANCE UR: ABNORMAL
BACTERIA #/AREA URNS HPF: ABNORMAL /[HPF]
BACTERIA UR CULT: ABNORMAL
BACTERIA UR CULT: ABNORMAL
BILIRUB UR QL STRIP: NEGATIVE
CASTS URNS QL MICRO: ABNORMAL /LPF
COLOR UR: YELLOW
EPI CELLS #/AREA URNS HPF: ABNORMAL /HPF (ref 0–10)
GLUCOSE UR QL STRIP: NEGATIVE
HGB UR QL STRIP: NEGATIVE
KETONES UR QL STRIP: NEGATIVE
LEUKOCYTE ESTERASE UR QL STRIP: NEGATIVE
MICRO URNS: ABNORMAL
MICRO URNS: ABNORMAL
NITRITE UR QL STRIP: NEGATIVE
OTHER ANTIBIOTIC SUSC ISLT: ABNORMAL
PH UR STRIP: 5 [PH] (ref 5–7.5)
PROT UR QL STRIP: NEGATIVE
RBC #/AREA URNS HPF: ABNORMAL /HPF (ref 0–2)
SP GR UR STRIP: 1.01 (ref 1–1.03)
URINALYSIS REFLEX: ABNORMAL
UROBILINOGEN UR STRIP-MCNC: 0.2 MG/DL (ref 0.2–1)
WBC #/AREA URNS HPF: ABNORMAL /HPF (ref 0–5)

## 2023-05-01 ENCOUNTER — TELEPHONE (OUTPATIENT)
Dept: FAMILY MEDICINE CLINIC | Facility: CLINIC | Age: 38
End: 2023-05-01
Payer: COMMERCIAL

## 2023-05-01 DIAGNOSIS — E55.9 VITAMIN D DEFICIENCY: ICD-10-CM

## 2023-05-01 DIAGNOSIS — N30.00 ACUTE CYSTITIS WITHOUT HEMATURIA: Primary | ICD-10-CM

## 2023-05-01 RX ORDER — MULTIVIT-MIN/IRON/FOLIC ACID/K 18-600-40
1 CAPSULE ORAL DAILY
Qty: 90 CAPSULE | Refills: 3 | Status: SHIPPED | OUTPATIENT
Start: 2023-05-01

## 2023-05-01 RX ORDER — NITROFURANTOIN 25; 75 MG/1; MG/1
100 CAPSULE ORAL 2 TIMES DAILY
Qty: 10 CAPSULE | Refills: 0 | Status: SHIPPED | OUTPATIENT
Start: 2023-05-01 | End: 2023-05-06

## 2023-05-01 NOTE — TELEPHONE ENCOUNTER
Phone conversation with patient regarding results of testing from 4/26/2023 as follows:    Urinalysis cloudy otherwise unremarkable other than the microscopic revealing many bacteria with culture greater than 100,000 colonies of E. coli pansensitive to all antibiotics tested including Macrobid  Iron studies normal  Vitamin D low at 23.8  Hep C negative  Hemoglobin A1c 5.1%  CBC normal  Lipid profile normal  CMP normal  TSH and free T4 both normal    Assessment/plan:  1.  Dyslipidemia, currently normal lipid profile on no related meds.  2.  Vitamin D deficiency.  Taking vitamin D 1000 IU daily rather than previous prescription of 50,000 IU weekly.  Increase vitamin D up to 2000 IU daily.  3.  Iron deficiency anemia, currently compensated on iron supplementation daily which we will continue.  Likely related to her heavy menses.  4.  Acute cystitis, asymptomatic but given Niccoli significant colony count will treat with Macrobid 100 mg twice daily x5 days with plenty of fluids.  Advised of any ongoing issues.  5.  History of thyromegaly with normal thyroid function studies.  Previous thyroglobulin level normal.  6.  Remainder of lab testing satisfactory.  7.  Patient acknowledged understanding of test results and recommendations.

## 2023-05-24 ENCOUNTER — OFFICE VISIT (OUTPATIENT)
Dept: FAMILY MEDICINE CLINIC | Facility: CLINIC | Age: 38
End: 2023-05-24
Payer: COMMERCIAL

## 2023-05-24 VITALS
WEIGHT: 293 LBS | DIASTOLIC BLOOD PRESSURE: 85 MMHG | OXYGEN SATURATION: 97 % | HEART RATE: 93 BPM | BODY MASS INDEX: 41.02 KG/M2 | HEIGHT: 71 IN | TEMPERATURE: 97.6 F | SYSTOLIC BLOOD PRESSURE: 130 MMHG

## 2023-05-24 DIAGNOSIS — I10 PRIMARY HYPERTENSION: ICD-10-CM

## 2023-05-24 DIAGNOSIS — E66.01 MORBID (SEVERE) OBESITY DUE TO EXCESS CALORIES: Primary | ICD-10-CM

## 2023-05-24 DIAGNOSIS — R82.90 MALODOROUS URINE: ICD-10-CM

## 2023-05-24 DIAGNOSIS — E55.9 VITAMIN D DEFICIENCY: ICD-10-CM

## 2023-05-24 LAB
BILIRUB BLD-MCNC: NEGATIVE MG/DL
CLARITY, POC: CLEAR
COLOR UR: YELLOW
GLUCOSE UR STRIP-MCNC: NEGATIVE MG/DL
KETONES UR QL: NEGATIVE
LEUKOCYTE EST, POC: NEGATIVE
NITRITE UR-MCNC: NEGATIVE MG/ML
PH UR: 6 [PH] (ref 5–8)
PROT UR STRIP-MCNC: NEGATIVE MG/DL
RBC # UR STRIP: NEGATIVE /UL
SP GR UR: 1.02 (ref 1–1.03)
UROBILINOGEN UR QL: NORMAL

## 2023-05-24 RX ORDER — SEMAGLUTIDE 1 MG/.5ML
1 INJECTION, SOLUTION SUBCUTANEOUS
Qty: 2 ML | Refills: 0 | Status: SHIPPED | OUTPATIENT
Start: 2023-05-24

## 2023-05-24 NOTE — PROGRESS NOTES
Follow Up Office Visit      Date: 2023   Patient Name: Jailyn Jalloh  : 1985   MRN: [id number]     Chief Complaint:    Chief Complaint   Patient presents with   • Follow-up       History of Present Illness: Jailyn Jalloh is a 37 y.o. female who is here today for primary purpose of following up for her obesity for which she is currently taking Wegovy 0.5 mg subcu weekly, having some mild nausea for the first several days after dosing but then subsiding.  She does note her appetite does reduce for the first few days but then starts to  subsequently till she takes her next dose.  No other side effects of medication.  She has been drinking primarily water or flavored water with no liquid calories, eating fruits and vegetables with white meat, not a lot of junk foods or fast foods, though admittedly she has not been exercising significantly as she had to obtain a second job for financial reasons and has to work many hours each day..  Patient also has a history of hypertension compliant with her lisinopril/HCTZ 20/12.5 mg twice daily and amlodipine 5 mg nightly, but not having checked her blood pressures in the last month.  Denies chest pains palpitations dyspnea dizziness or edema.  She does note for the last couple weeks having some morning urine cloudiness with malodor that seems to get better as the day progresses.  No actual dysuria hematuria, no pelvic or back pain and no fevers or chills.  She was treated for an acute cystitis several weeks ago with Macrobid.    Subjective      Review of Systems:   Review of Systems    I have reviewed the patients family history, social history, past medical history, past surgical history and have updated it as appropriate.     Medications:     Current Outpatient Medications:   •  amLODIPine (NORVASC) 5 MG tablet, Take 1 tablet by mouth Daily. For blood pressure, Disp: 90 tablet, Rfl: 3  •  FeroSul 325 (65 Fe) MG tablet, TAKE 1 TABLET BY MOUTH  "EVERY DAY WITH VITAMIN TABLET -500 MG, Disp: , Rfl:   •  lisinopril-hydrochlorothiazide (PRINZIDE,ZESTORETIC) 20-12.5 MG per tablet, TAKE 1 TABLET BY MOUTH TWICE DAILY, Disp: 180 tablet, Rfl: 2  •  Vitamin D, Cholecalciferol, 50 MCG (2000 UT) capsule, Take 1 capsule by mouth Daily., Disp: 90 capsule, Rfl: 3  •  Semaglutide-Weight Management (Wegovy) 1 MG/0.5ML solution auto-injector, Inject 0.5 mL under the skin into the appropriate area as directed Every 7 (Seven) Days., Disp: 2 mL, Rfl: 0    Allergies:   Allergies   Allergen Reactions   • Bactrim [Sulfamethoxazole-Trimethoprim] Rash       Objective     Physical Exam: Please see above  Vital Signs:   Vitals:    05/24/23 0832   BP: 130/85   BP Location: Left arm   Patient Position: Sitting   Cuff Size: Large Adult   Pulse: 93   Temp: 97.6 °F (36.4 °C)   TempSrc: Temporal   SpO2: 97%   Weight: (!) 140 kg (307 lb 12.8 oz)   Height: 180.3 cm (71\")     Body mass index is 42.93 kg/m².  Class 3 Severe Obesity (BMI >=40). Obesity-related health conditions include the following: hypertension. Obesity is improving with treatment. BMI is is above average; BMI management plan is completed. We discussed portion control, increasing exercise and Wegovy therapy.       Physical Exam  General: Taller statured very pleasant healthy-appearing female who is in no acute distress.  BMI 42.9, weight down 2 pounds in the last month, down 15 pounds in the last 2 months  Neck supple with no adenopathy or masses having some mild chronic smooth symmetric thyromegaly without nodules  Lungs are clear with no wheeze tachypnea or cough  Cardiac regular rate rhythm with no murmurs gallops or rubs, no dependent edema  Abdomen with centripetal obesity soft and nontender with no organomegaly or masses and normal bowel sounds  Back with no CVA tenderness  Neurological exam grossly normal  Procedures    Results:   Labs:   Hemoglobin A1C   Date Value Ref Range Status   04/26/2023 5.1 4.8 - 5.6 % " Final     Comment:              Prediabetes: 5.7 - 6.4           Diabetes: >6.4           Glycemic control for adults with diabetes: <7.0       TSH   Date Value Ref Range Status   04/26/2023 1.600 0.450 - 4.500 uIU/mL Final        POCT Results (if applicable):   Results for orders placed or performed in visit on 05/24/23   POC Urinalysis Dipstick    Specimen: Urine   Result Value Ref Range    Color Yellow Yellow, Straw, Dark Yellow, Suzanne    Clarity, UA Clear Clear    Glucose, UA Negative Negative mg/dL    Bilirubin Negative Negative    Ketones, UA Negative Negative    Specific Gravity  1.020 1.005 - 1.030    Blood, UA Negative Negative    pH, Urine 6.0 5.0 - 8.0    Protein, POC Negative Negative mg/dL    Urobilinogen, UA 0.2 E.U./dL Normal, 0.2 E.U./dL    Leukocytes Negative Negative    Nitrite, UA Negative Negative       Imaging:   No valid procedures specified.       Assessment / Plan      Assessment/Plan:   Diagnoses and all orders for this visit:    1. Morbid (severe) obesity due to excess calories (Primary)  -     Semaglutide-Weight Management (Wegovy) 1 MG/0.5ML solution auto-injector; Inject 0.5 mL under the skin into the appropriate area as directed Every 7 (Seven) Days.  Dispense: 2 mL; Refill: 0  Patient continues slow measured progress over the last month regarding her weight loss on the Wegovy 0.5 mg subcu weekly for the last 4 weeks, and per protocol we will increase Wegovy up to 1 mg subcu weekly for the next 4 weeks after which we will reassess clinically, planning further titration per protocol as tolerated.  She unfortunately not been able to exercise as much lately given she is now having to work 2 jobs but will attempt to improve this as able.  She generally is eating a healthy diet.  2. Primary hypertension  Satisfactory blood pressure control acutely, chronic control unknown taking her lisinopril/HCTZ 20/12.5 mg twice daily and amlodipine 5 mg nightly, also pursuing lifestyle changes.  Advised  to start monitoring blood pressures once or twice weekly with ideal parameters discussed.  Reassess next visit  3. Vitamin D deficiency  Last month had noted vitamin D deficiency, subsequently having had an increase in her vitamin D 1000 IU daily up to 2000 IU daily.  4. Malodorous urine  -     POC Urinalysis Dipstick  Urinalysis today unremarkable with negative blood, leukocytes and nitrates.  Likely represents concentrated urine in the morning.  Push plenty of fluids.      Follow Up:   Return in about 1 month (around 6/24/2023) for Recheck.      At [facility], we believe that sharing information builds trust and better relationships. You are receiving this note because you recently visited [facility]. It is possible you will see health information before a provider has talked with you about it. This kind of information can be easy to misunderstand. To help you fully understand what it means for your health, we urge you to discuss this note with your provider.    Pop Rudd MD  [facility]   Answers for HPI/ROS submitted by the patient on 5/22/2023  What is the primary reason for your visit?: Physical

## 2023-06-14 DIAGNOSIS — E66.01 MORBID (SEVERE) OBESITY DUE TO EXCESS CALORIES: ICD-10-CM

## 2023-06-14 RX ORDER — SEMAGLUTIDE 1 MG/.5ML
1 INJECTION, SOLUTION SUBCUTANEOUS
Qty: 2 ML | Refills: 0 | OUTPATIENT
Start: 2023-06-14

## 2023-06-14 NOTE — TELEPHONE ENCOUNTER
Caller: Jailyn Jalloh    Relationship: Self    Best call back number:[phone]    Requested Prescriptions:   Requested Prescriptions     Pending Prescriptions Disp Refills    Semaglutide-Weight Management (Wegovy) 1 MG/0.5ML solution auto-injector 2 mL 0     Sig: Inject 0.5 mL under the skin into the appropriate area as directed Every 7 (Seven) Days.        Pharmacy where request should be sent: [id number] - [place], [place] - [address]  AT [address] - [phone]  - [phone] FX     Last office visit with prescribing clinician: 5/24/2023   Last telemedicine visit with prescribing clinician: Visit date not found   Next office visit with prescribing clinician: 7/12/2023     Additional details provided by patient: STATED SHE WILL BE OUT OF RX BY SUNDAY    Does the patient have less than a 3 day supply:  [] Yes  [x] No    Would you like a call back once the refill request has been completed: [] Yes [x] No    If the office needs to give you a call back, can they leave a voicemail: [] Yes [x] No    Senthil Michaels   06/14/23 14:06 EDT

## 2023-06-14 NOTE — TELEPHONE ENCOUNTER
I have called and let her know that she will need to be seen before filling. She has made an appt. TF

## 2023-06-19 RX ORDER — FERROUS SULFATE 325(65) MG
TABLET ORAL
Qty: 90 TABLET | Refills: 2 | Status: SHIPPED | OUTPATIENT
Start: 2023-06-19

## 2023-08-03 DIAGNOSIS — Z00.01 ENCOUNTER FOR GENERAL ADULT MEDICAL EXAMINATION WITH ABNORMAL FINDINGS: ICD-10-CM

## 2023-08-03 DIAGNOSIS — Z12.31 ENCOUNTER FOR SCREENING MAMMOGRAM FOR MALIGNANT NEOPLASM OF BREAST: ICD-10-CM

## 2023-08-22 RX ORDER — MELATONIN
Qty: 90 TABLET | Refills: 1 | Status: SHIPPED | OUTPATIENT
Start: 2023-08-22

## 2023-08-29 ENCOUNTER — TELEPHONE (OUTPATIENT)
Dept: FAMILY MEDICINE CLINIC | Facility: CLINIC | Age: 38
End: 2023-08-29
Payer: COMMERCIAL

## 2023-08-29 NOTE — TELEPHONE ENCOUNTER
Caller: IRISH    Relationship to patient: Other    Best call back number: [phone]    REFERENCE NUMBER:  [id number]     Patient is needing: VALDO CALLED TO FOLLOW UP ON PRIOR AUTHORIZATION FOR WEGOVY THAT WAS FAXED 8/17.

## 2023-08-29 NOTE — TELEPHONE ENCOUNTER
The Pa has been done 12 days ago but her insurance hasn't replied to the request. TF  I will call the insurance company today to see what is going on. TF    I have spoke with her insurance and they will fax over a new form for her PA due to the insurance still reviewing. HOA

## 2023-09-06 ENCOUNTER — OFFICE VISIT (OUTPATIENT)
Dept: FAMILY MEDICINE CLINIC | Facility: CLINIC | Age: 38
End: 2023-09-06
Payer: COMMERCIAL

## 2023-09-06 VITALS
DIASTOLIC BLOOD PRESSURE: 84 MMHG | WEIGHT: 293 LBS | OXYGEN SATURATION: 98 % | BODY MASS INDEX: 41.02 KG/M2 | TEMPERATURE: 98.2 F | HEART RATE: 86 BPM | HEIGHT: 71 IN | SYSTOLIC BLOOD PRESSURE: 122 MMHG

## 2023-09-06 DIAGNOSIS — E55.9 VITAMIN D DEFICIENCY: ICD-10-CM

## 2023-09-06 DIAGNOSIS — E66.01 MORBID (SEVERE) OBESITY DUE TO EXCESS CALORIES: Primary | ICD-10-CM

## 2023-09-06 DIAGNOSIS — I10 PRIMARY HYPERTENSION: ICD-10-CM

## 2023-09-06 RX ORDER — SEMAGLUTIDE 2.4 MG/.75ML
2.4 INJECTION, SOLUTION SUBCUTANEOUS
Qty: 3 ML | Refills: 11 | Status: SHIPPED | OUTPATIENT
Start: 2023-09-06

## 2023-09-06 RX ORDER — MELATONIN
1000 DAILY
COMMUNITY

## 2023-09-06 NOTE — PROGRESS NOTES
Follow Up Office Visit      Date: 2023   Patient Name: Jailyn Jalloh  : 1985   MRN: [id number]     Chief Complaint:    Chief Complaint   Patient presents with    Follow-up       History of Present Illness: Jailyn Jalloh is a 38 y.o. female who is here today for follow-up regarding her obesity, last visit having had an increase in Wegovy from 1.7 mg up to 2.4 mg subcu weekly, patient having taken 4 doses of this medication.  She indicates really no side effects of the medicine other than occasional mild nausea when she first takes the dose.  She has been exercising for 30 minutes 3 days a week and intends to improve.  Her diet in general is quite good with some fruits and vegetables, not drinking any calories, rarely eats fast food and limited junk foods.  We also discussed her hypertension with patient taking amlodipine 10 mg daily, lisinopril/HCTZ 20/12.5 mg twice daily, patient denies chest pains palpitations dyspnea or edema.  Unfortunately she has not checked her blood pressure since her last visit but blood pressure today is very satisfactory.  I also discussed the fact that she does have vitamin D insufficiency, patient indicating she was not taking the vitamin D 2000 IU daily as prescribed.  She indicates however she is taking vitamin D 1000 IU daily.  No other acute concerns..    Subjective      Review of Systems:   Review of Systems    I have reviewed the patients family history, social history, past medical history, past surgical history and have updated it as appropriate.     Medications:     Current Outpatient Medications:     amLODIPine (NORVASC) 5 MG tablet, Take 1 tablet by mouth Daily. For blood pressure, Disp: 90 tablet, Rfl: 3    ferrous sulfate (FeroSul) 325 (65 FE) MG tablet, TAKE 1 TABLET BY MOUTH EVERY DAY, Disp: 90 tablet, Rfl: 2    lisinopril-hydrochlorothiazide (PRINZIDE,ZESTORETIC) 20-12.5 MG per tablet, TAKE 1 TABLET BY MOUTH TWICE DAILY, Disp: 180 tablet, Rfl:  "2    Semaglutide-Weight Management (Wegovy) 2.4 MG/0.75ML solution auto-injector, Inject 2.4 mg under the skin into the appropriate area as directed Every 7 (Seven) Days., Disp: 3 mL, Rfl: 11    Cholecalciferol 25 MCG (1000 UT) tablet, Take 1 tablet by mouth Daily., Disp: , Rfl:     Allergies:   Allergies   Allergen Reactions    Bactrim [Sulfamethoxazole-Trimethoprim] Rash       Objective     Physical Exam: Please see above  Vital Signs:   Vitals:    09/06/23 0828   BP: 122/84   BP Location: Left arm   Patient Position: Sitting   Cuff Size: Adult   Pulse: 86   Temp: 98.2 °F (36.8 °C)   TempSrc: Temporal   SpO2: 98%   Weight: 135 kg (297 lb 6.4 oz)   Height: 180.3 cm (71\")     Body mass index is 41.48 kg/m².          Physical Exam  General: Pleasant healthy 38-year-old female in no acute distress, BMI 41.4 reflecting a 5 pound weight loss in the last 6 weeks  Neck supple with no masses or tenderness  Lungs clear  Cardiac regular rate rhythm with no murmurs gallops or rubs  Neurological exam grossly normalAnswers submitted by the patient for this visit:  Primary Reason for Visit (Submitted on 9/6/2023)  What is the primary reason for your visit?: Physical    Procedures    Results:   Labs:   Hemoglobin A1C   Date Value Ref Range Status   04/26/2023 5.1 4.8 - 5.6 % Final     Comment:              Prediabetes: 5.7 - 6.4           Diabetes: >6.4           Glycemic control for adults with diabetes: <7.0       TSH   Date Value Ref Range Status   04/26/2023 1.600 0.450 - 4.500 uIU/mL Final        POCT Results (if applicable):   Results for orders placed or performed in visit on 05/24/23   POC Urinalysis Dipstick    Specimen: Urine   Result Value Ref Range    Color Yellow Yellow, Straw, Dark Yellow, Suzanne    Clarity, UA Clear Clear    Glucose, UA Negative Negative mg/dL    Bilirubin Negative Negative    Ketones, UA Negative Negative    Specific Gravity  1.020 1.005 - 1.030    Blood, UA Negative Negative    pH, Urine 6.0 5.0 - " 8.0    Protein, POC Negative Negative mg/dL    Urobilinogen, UA 0.2 E.U./dL Normal, 0.2 E.U./dL    Leukocytes Negative Negative    Nitrite, UA Negative Negative         Assessment / Plan      Assessment/Plan:   Diagnoses and all orders for this visit:    1. Morbid (severe) obesity due to excess calories (Primary)  -     Semaglutide-Weight Management (Wegovy) 2.4 MG/0.75ML solution auto-injector; Inject 2.4 mg under the skin into the appropriate area as directed Every 7 (Seven) Days.  Dispense: 3 mL; Refill: 11  Patient overall having a very satisfactory clinical response to her Wegovy currently at 2.4 mg subcu weekly.  5 pound weight loss in the last month.  Continue current regimen along with efforts at lifestyle change with diet and exercise.  We will reassess clinically at her follow-up visit.  We did also discuss the option of bariatric surgery if her weight weight loss levels out will add a suboptimal BMI.  2. Primary hypertension  Very satisfactory blood pressure control acutely, chronic control unknown taking amlodipine 10 mg daily and lisinopril HCTZ/12.5 mg twice daily.  Start checking blood pressures at least twice weekly with ideal parameters discussed, pursue healthy lifestyle.  3. Vitamin D deficiency  Reinitiate vitamin D 1000 IU daily.    Follow Up:   Return in about 8 months (around 5/6/2024) for Annual physical.      At [facility], we believe that sharing information builds trust and better relationships. You are receiving this note because you recently visited [facility]. It is possible you will see health information before a provider has talked with you about it. This kind of information can be easy to misunderstand. To help you fully understand what it means for your health, we urge you to discuss this note with your provider.    Pop Rudd MD  [facility]

## 2023-11-29 RX ORDER — LISINOPRIL AND HYDROCHLOROTHIAZIDE 20; 12.5 MG/1; MG/1
TABLET ORAL
Qty: 180 TABLET | Refills: 2 | Status: SHIPPED | OUTPATIENT
Start: 2023-11-29

## 2023-12-19 ENCOUNTER — TELEPHONE (OUTPATIENT)
Dept: FAMILY MEDICINE CLINIC | Facility: CLINIC | Age: 38
End: 2023-12-19
Payer: COMMERCIAL

## 2023-12-19 NOTE — TELEPHONE ENCOUNTER
Hub staff attempted to follow warm transfer process and was unsuccessful     Caller: Jailyn Jalloh    Relationship to patient: Self    Best call back number: [phone]     Patient is needing: THE OFFICE WAS NOT OPEN YET. PATIENT CALLED AT 8AM TO GET A SAME DAY APPOINTMENT.  SHE CHOSE TO GO AHEAD AND GET AN APPOINTMENT SET FOR TOMORROW JUST IN CASE SHE CANNOT GET ONE TODAY.  PLEASE CALL TO LET HER KNOW IF WE CAN WORK HER IN TODAY. HER SYMPTOMS ARE COUGH, RUNNY NOSE, HEADACHE, SHORT OF BREATH WITH EXERTION

## 2023-12-19 NOTE — TELEPHONE ENCOUNTER
I have spoke with her and have let her know that [facility] is booked today and alexandrea is the earliest we can get her in. She does sound very sick so I have advised that if the breathing gets worse than she needs to go to the ER. TF

## 2023-12-20 ENCOUNTER — OFFICE VISIT (OUTPATIENT)
Dept: FAMILY MEDICINE CLINIC | Facility: CLINIC | Age: 38
End: 2023-12-20
Payer: COMMERCIAL

## 2023-12-20 VITALS
SYSTOLIC BLOOD PRESSURE: 130 MMHG | BODY MASS INDEX: 41.02 KG/M2 | WEIGHT: 293 LBS | OXYGEN SATURATION: 98 % | TEMPERATURE: 97.3 F | HEIGHT: 71 IN | HEART RATE: 110 BPM | DIASTOLIC BLOOD PRESSURE: 82 MMHG | RESPIRATION RATE: 18 BRPM

## 2023-12-20 DIAGNOSIS — J02.9 SORE THROAT: Primary | ICD-10-CM

## 2023-12-20 DIAGNOSIS — U07.1 COVID-19: ICD-10-CM

## 2023-12-20 LAB
EXPIRATION DATE: ABNORMAL
FLUAV AG UPPER RESP QL IA.RAPID: NOT DETECTED
FLUBV AG UPPER RESP QL IA.RAPID: NOT DETECTED
INTERNAL CONTROL: ABNORMAL
Lab: ABNORMAL
SARS-COV-2 AG UPPER RESP QL IA.RAPID: DETECTED

## 2023-12-20 PROCEDURE — 99213 OFFICE O/P EST LOW 20 MIN: CPT | Performed by: NURSE PRACTITIONER

## 2023-12-20 PROCEDURE — 87428 SARSCOV & INF VIR A&B AG IA: CPT | Performed by: NURSE PRACTITIONER

## 2023-12-20 RX ORDER — DEXTROMETHORPHAN HYDROBROMIDE AND PROMETHAZINE HYDROCHLORIDE 15; 6.25 MG/5ML; MG/5ML
5 SYRUP ORAL 4 TIMES DAILY PRN
Qty: 180 ML | Refills: 0 | Status: SHIPPED | OUTPATIENT
Start: 2023-12-20

## 2023-12-20 NOTE — PROGRESS NOTES
"    Office Note     Name: Jailyn Jalloh    : 1985     MRN: [id number]     Chief Complaint  Cough, Nasal Congestion, Headache, Sore Throat, and Chills    Subjective     History of Present Illness:  Jailyn Jalloh is a 38 y.o. female who presents today for symptoms including cough, nasal congestion, headache, sore throat, chills and diarrhea.  She notes her first day of symptoms was 4 days ago.  She has utilized NyQuil, Corie-Smoot cold and sinus as well as Mucinex without any significant benefit.  She did have a short-lived case of diarrheal type stools which is now resolved.  Today she also notes resolution of her sore throat.  She denies any nausea or vomiting.  No real issues with decreased appetite.  She took a home COVID test day before yesterday which was negative.  She has no further complaints or concerns today     Review of Systems   Constitutional:  Positive for chills. Negative for fever and unexpected weight loss.   HENT:  Positive for rhinorrhea. Negative for ear pain, postnasal drip and sore throat.    Respiratory:  Positive for cough. Negative for shortness of breath and wheezing.    Cardiovascular:  Negative for chest pain.   Musculoskeletal:  Negative for myalgias.   Skin:  Negative for rash.       Objective     Past Medical History:   Diagnosis Date    Gallstones     GERD (gastroesophageal reflux disease)     Hypertension     Iron deficiency anemia      Past Surgical History:   Procedure Laterality Date     SECTION       x2    GALLBLADDER SURGERY      10/21    LAPAROSCOPIC TUBAL LIGATION      TONSILLECTOMY      TUBAL ABDOMINAL LIGATION  2006     Family History   Problem Relation Age of Onset    Diabetes Father     Hypertension Father     Thyroid disease Mother     Hypertension Mother        Vital Signs  /82 (BP Location: Left arm, Patient Position: Sitting, Cuff Size: Adult)   Pulse 110   Temp 97.3 °F (36.3 °C) (Temporal)   Resp 18   Ht 180.3 cm (71\")  " " Wt 135 kg (297 lb)   SpO2 98%   BMI 41.42 kg/m²   Estimated body mass index is 41.42 kg/m² as calculated from the following:    Height as of this encounter: 180.3 cm (71\").    Weight as of this encounter: 135 kg (297 lb).    Physical Exam  Vitals reviewed.   HENT:      Head: Normocephalic and atraumatic.      Right Ear: Tympanic membrane, ear canal and external ear normal.      Left Ear: Tympanic membrane, ear canal and external ear normal.      Nose: Congestion present.      Mouth/Throat:      Pharynx: Oropharynx is clear. Posterior oropharyngeal erythema present.   Eyes:      Conjunctiva/sclera: Conjunctivae normal.   Cardiovascular:      Rate and Rhythm: Normal rate and regular rhythm.      Pulses: Normal pulses.      Heart sounds: Normal heart sounds.   Pulmonary:      Effort: Pulmonary effort is normal.      Breath sounds: Normal breath sounds.   Abdominal:      Palpations: Abdomen is soft.   Musculoskeletal:         General: Normal range of motion.   Skin:     General: Skin is warm and dry.      Capillary Refill: Capillary refill takes less than 2 seconds.   Neurological:      Mental Status: She is alert and oriented to person, place, and time.             POCT Results (if applicable):  Results for orders placed or performed in visit on 12/20/23   Covid-19 + Flu A&B AG, Veritor    Specimen: Swab   Result Value Ref Range    SARS Antigen Detected (A) Not Detected, Presumptive Negative    Influenza A Antigen CATHERINE Not Detected Not Detected    Influenza B Antigen CATHERINE Not Detected Not Detected    Internal Control Passed Passed    Lot Number [id number]     Expiration Date 11,032,024             Assessment and Plan     Diagnoses and all orders for this visit:    1. Sore throat (Primary)  -     Covid-19 + Flu A&B AG, Veritor    2. COVID-19  Assessment & Plan:  Patient testing positive for COVID-19 in office today.  Advised on symptom management with ibuprofen, cool-mist humidifier and saline nasal sprays.  I will " also send prescription for Promethazine DM to address cough and congestion.  Patient advised to continue plenty of fluids and rest.  Per CDC guidelines patient should quarantine for 5 days with additional 5 days of mask wearing once returning to normal activities.  Patient is to contact office if no improvement.    Orders:  -     promethazine-dextromethorphan (PROMETHAZINE-DM) 6.25-15 MG/5ML syrup; Take 5 mL by mouth 4 (Four) Times a Day As Needed for Cough.  Dispense: 180 mL; Refill: 0               Follow Up  No follow-ups on file.    JAME Moreno  Answers submitted by the patient for this visit:  Primary Reason for Visit (Submitted on 12/20/2023)  What is the primary reason for your visit?: Cough  Cough Questionnaire (Submitted on 12/20/2023)  Chief Complaint: Cough  Chronicity: new  Onset: in the past 7 days  Progression since onset: unchanged  Frequency: constantly  Cough characteristics: dry  ear congestion: Yes  headaches: Yes  heartburn: No  hemoptysis: No  nasal congestion: Yes  sweats: No  Aggravated by: nothing

## 2023-12-20 NOTE — ASSESSMENT & PLAN NOTE
Patient testing positive for COVID-19 in office today.  Advised on symptom management with ibuprofen, cool-mist humidifier and saline nasal sprays.  I will also send prescription for Promethazine DM to address cough and congestion.  Patient advised to continue plenty of fluids and rest.  Per CDC guidelines patient should quarantine for 5 days with additional 5 days of mask wearing once returning to normal activities.  Patient is to contact office if no improvement.

## 2024-05-27 DIAGNOSIS — I10 PRIMARY HYPERTENSION: ICD-10-CM

## 2024-05-28 RX ORDER — AMLODIPINE BESYLATE 5 MG/1
5 TABLET ORAL DAILY
Qty: 90 TABLET | Refills: 3 | Status: SHIPPED | OUTPATIENT
Start: 2024-05-28

## 2024-08-16 ENCOUNTER — OFFICE VISIT (OUTPATIENT)
Dept: FAMILY MEDICINE CLINIC | Facility: CLINIC | Age: 39
End: 2024-08-16
Payer: OTHER MISCELLANEOUS

## 2024-08-16 VITALS
BODY MASS INDEX: 41.02 KG/M2 | RESPIRATION RATE: 18 BRPM | OXYGEN SATURATION: 98 % | SYSTOLIC BLOOD PRESSURE: 170 MMHG | HEIGHT: 71 IN | WEIGHT: 293 LBS | HEART RATE: 101 BPM | DIASTOLIC BLOOD PRESSURE: 100 MMHG | TEMPERATURE: 99.1 F
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
## 2024-08-16 DIAGNOSIS — S39.012A STRAIN OF LUMBAR REGION, INITIAL ENCOUNTER: ICD-10-CM

## 2024-08-16 DIAGNOSIS — S13.4XXA WHIPLASH INJURY TO NECK, INITIAL ENCOUNTER: ICD-10-CM

## 2024-08-16 DIAGNOSIS — S30.811A ABRASION OF ABDOMINAL WALL, INITIAL ENCOUNTER: ICD-10-CM

## 2024-08-16 DIAGNOSIS — S29.019A THORACIC MYOFASCIAL STRAIN, INITIAL ENCOUNTER: ICD-10-CM

## 2024-08-16 DIAGNOSIS — S20.211A CONTUSION OF RIGHT FRONT WALL OF THORAX, INITIAL ENCOUNTER: ICD-10-CM

## 2024-08-16 DIAGNOSIS — S46.911A STRAIN OF RIGHT SHOULDER, INITIAL ENCOUNTER: ICD-10-CM

## 2024-08-16 RX ORDER — ACETAMINOPHEN 500 MG
1000 TABLET ORAL EVERY 6 HOURS PRN
COMMUNITY
Start: 2024-08-14 | End: 2024-08-24

## 2024-08-16 RX ORDER — IBUPROFEN 800 MG/1
800 TABLET ORAL EVERY 6 HOURS PRN
COMMUNITY
Start: 2024-08-14

## 2024-08-16 RX ORDER — METHOCARBAMOL 500 MG/1
500 TABLET, FILM COATED ORAL 4 TIMES DAILY
COMMUNITY
Start: 2024-08-14 | End: 2024-09-13
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
## 2024-08-28 ENCOUNTER — OFFICE VISIT (OUTPATIENT)
Dept: FAMILY MEDICINE CLINIC | Facility: CLINIC | Age: 39
End: 2024-08-28
Payer: OTHER MISCELLANEOUS

## 2024-08-28 VITALS
HEART RATE: 94 BPM | DIASTOLIC BLOOD PRESSURE: 92 MMHG | RESPIRATION RATE: 18 BRPM | OXYGEN SATURATION: 97 % | SYSTOLIC BLOOD PRESSURE: 142 MMHG | TEMPERATURE: 98.2 F | HEIGHT: 71 IN | BODY MASS INDEX: 41.02 KG/M2 | WEIGHT: 293 LBS

## 2024-08-28 DIAGNOSIS — S20.211D CONTUSION OF RIGHT FRONT WALL OF THORAX, SUBSEQUENT ENCOUNTER: ICD-10-CM

## 2024-08-28 DIAGNOSIS — S29.019D THORACIC MYOFASCIAL STRAIN, SUBSEQUENT ENCOUNTER: ICD-10-CM
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
## 2024-08-28 DIAGNOSIS — R07.89 RIGHT-SIDED CHEST WALL PAIN: ICD-10-CM

## 2024-08-28 DIAGNOSIS — S46.911D STRAIN OF RIGHT SHOULDER, SUBSEQUENT ENCOUNTER: ICD-10-CM

## 2024-08-28 PROCEDURE — 99214 OFFICE O/P EST MOD 30 MIN: CPT | Performed by: INTERNAL MEDICINE
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
## 2024-08-28 NOTE — ASSESSMENT & PLAN NOTE
Having some persisting right upper thoracic paraspinal muscular discomfort extending to the right several neck and trapezius region towards the shoulder but this does not appear to be improving with chiropractic manipulation.  Continue current regimen, advising patient that if she is not getting adequate improvement in her her pain relief, then we will will refer to physical therapy.  Advise accordingly.  Also continue previously recommended Motrin, Tylenol, Robaxin as needed, as well as muscle rub cream.
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
## 2024-08-30 DIAGNOSIS — R07.89 RIGHT-SIDED CHEST WALL PAIN: ICD-10-CM

## 2024-08-30 DIAGNOSIS — S20.211D CONTUSION OF RIGHT FRONT WALL OF THORAX, SUBSEQUENT ENCOUNTER: ICD-10-CM
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
## 2024-09-03 ENCOUNTER — TELEPHONE (OUTPATIENT)
Dept: FAMILY MEDICINE CLINIC | Facility: CLINIC | Age: 39
End: 2024-09-03
Payer: COMMERCIAL

## 2024-09-03 NOTE — TELEPHONE ENCOUNTER
I have tried to call but sounds like someone picks up and then it hangs the phone up. Have called twice and it has done this. TF

## 2024-09-03 NOTE — TELEPHONE ENCOUNTER
----- Message from Pop Rudd sent at 8/30/2024  7:58 PM EDT -----  Please advise patient that her recent chest x-ray with rib detail revealed no evidence of a fracture.  I did leave a message on her phone regarding the findings but I want to ensure that she gets the message.

## 2024-09-04 ENCOUNTER — TELEPHONE (OUTPATIENT)
Dept: FAMILY MEDICINE CLINIC | Facility: CLINIC | Age: 39
End: 2024-09-04
Payer: COMMERCIAL

## 2024-09-04 NOTE — TELEPHONE ENCOUNTER
----- Message from Pop Rudd sent at 8/30/2024  7:58 PM EDT -----  Please advise patient that her recent chest x-ray with rib detail revealed no evidence of a fracture.  I did leave a message on her phone regarding the findings but I want to ensure that she gets the message.    I have tried to call her regarding her results with no answer. I have also let her a vm regarding this and have asked that she call with any questions. TF

## 2024-10-16 ENCOUNTER — OFFICE VISIT (OUTPATIENT)
Dept: FAMILY MEDICINE CLINIC | Facility: CLINIC | Age: 39
End: 2024-10-16
Payer: COMMERCIAL

## 2024-10-16 VITALS
SYSTOLIC BLOOD PRESSURE: 124 MMHG | HEART RATE: 87 BPM | WEIGHT: 293 LBS | DIASTOLIC BLOOD PRESSURE: 84 MMHG | HEIGHT: 71 IN | RESPIRATION RATE: 18 BRPM | BODY MASS INDEX: 41.02 KG/M2 | TEMPERATURE: 97.6 F | OXYGEN SATURATION: 98 %

## 2024-10-16 DIAGNOSIS — E78.5 DYSLIPIDEMIA: ICD-10-CM

## 2024-10-16 DIAGNOSIS — Z00.01 ENCOUNTER FOR GENERAL ADULT MEDICAL EXAMINATION WITH ABNORMAL FINDINGS: Primary | ICD-10-CM

## 2024-10-16 DIAGNOSIS — E66.01 MORBID (SEVERE) OBESITY DUE TO EXCESS CALORIES: ICD-10-CM

## 2024-10-16 DIAGNOSIS — E55.9 VITAMIN D DEFICIENCY: ICD-10-CM

## 2024-10-16 DIAGNOSIS — D50.8 OTHER IRON DEFICIENCY ANEMIA: ICD-10-CM

## 2024-10-16 DIAGNOSIS — I10 PRIMARY HYPERTENSION: ICD-10-CM

## 2024-10-16 DIAGNOSIS — R94.31 ABNORMAL EKG: ICD-10-CM

## 2024-10-16 DIAGNOSIS — Z92.89 HISTORY OF STRESS TEST: ICD-10-CM

## 2024-10-16 DIAGNOSIS — Z12.31 ENCOUNTER FOR SCREENING MAMMOGRAM FOR MALIGNANT NEOPLASM OF BREAST: ICD-10-CM

## 2024-10-16 DIAGNOSIS — E01.0 THYROMEGALY: ICD-10-CM

## 2024-10-16 NOTE — ASSESSMENT & PLAN NOTE
39-year-old female presents for complete physical with specific health issues being addressed as detailed below, EKG revealing a stable inferior T wave abnormality with history of prior normal stress echocardiogram, Paps and by history up-to-date through gynecology within the last 2 years, status post influenza vaccine obtain through work and will obtain also COVID-vaccine through her employer in the near future, did have some laboratory testing from 8/2024 with limited update being pursued today.

## 2024-10-16 NOTE — ASSESSMENT & PLAN NOTE
Very satisfactory blood pressure control acutely, chronic control unknown taking lisinopril/HCTZ 20/12.5 mg twice daily and amlodipine 5 mg daily.  Start monitoring blood pressures regularly with ideal parameters discussed, pursuing healthy lifestyle change with diet exercise attempts at weight loss and avoidance of added salt.
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
## 2024-10-16 NOTE — ASSESSMENT & PLAN NOTE
History of iron deficiency anemia presumed secondary to menometrorrhagia which has improved, most recent creatinine 12.4 in 8/2024, to update testing with additional iron studies B12 and folic acid.

## 2024-10-16 NOTE — ASSESSMENT & PLAN NOTE
EKG today indicating stable inferior T wave  with history of normal stress echocardiogram in 2021, no concerning symptoms.

## 2024-10-16 NOTE — ASSESSMENT & PLAN NOTE
Continues with longstanding struggles with obesity, prescribed Wegovy in 3/2023 continuing currently at dose of 2.4 mg subcu weekly now for 19 months, total of 24 pound weight loss over that time interval including 2 pounds in the last 2 months.  She continues to attempt healthy lifestyle though still does have some suboptimal diet with limited exercise.  We did discuss need for significant weight loss, and discussed the concept of bariatric surgery.  I have advised her to contact [place] [facility] to attend a seminar for more information.

## 2024-10-16 NOTE — PROGRESS NOTES
Venipuncture Blood Specimen Collection  Venipuncture performed in right arm by La Nena Hurley MA with good hemostasis. Patient tolerated the procedure well without complications.   10/16/24   La Nena Hurley MA

## 2024-10-16 NOTE — ASSESSMENT & PLAN NOTE
History of normal TFTs with thyroid ultrasound 3/2019 revealing 0.6 x 0.4 mm colloid cyst left lobe of thyroid status post ENT consultation with Dr. Pacheco, no follow-up recommended.  TFTs to be repeated today

## 2024-10-17 DIAGNOSIS — E66.01 MORBID (SEVERE) OBESITY DUE TO EXCESS CALORIES: ICD-10-CM

## 2024-10-17 LAB
25(OH)D3+25(OH)D2 SERPL-MCNC: 24.7 NG/ML (ref 30–100)
ALBUMIN SERPL-MCNC: 4.2 G/DL (ref 3.9–4.9)
ALP SERPL-CCNC: 93 IU/L (ref 44–121)
ALT SERPL-CCNC: 23 IU/L (ref 0–32)
AST SERPL-CCNC: 19 IU/L (ref 0–40)
BASOPHILS # BLD AUTO: 0 X10E3/UL (ref 0–0.2)
BASOPHILS NFR BLD AUTO: 1 %
BILIRUB SERPL-MCNC: 0.4 MG/DL (ref 0–1.2)
BUN SERPL-MCNC: 9 MG/DL (ref 6–24)
BUN/CREAT SERPL: 14 (ref 9–23)
CALCIUM SERPL-MCNC: 9.5 MG/DL (ref 8.7–10.2)
CHLORIDE SERPL-SCNC: 102 MMOL/L (ref 96–106)
CHOLEST SERPL-MCNC: 178 MG/DL (ref 100–199)
CO2 SERPL-SCNC: 20 MMOL/L (ref 20–29)
CREAT SERPL-MCNC: 0.65 MG/DL (ref 0.57–1)
EGFRCR SERPLBLD CKD-EPI 2021: 113 ML/MIN/1.73
EOSINOPHIL # BLD AUTO: 0.1 X10E3/UL (ref 0–0.4)
EOSINOPHIL NFR BLD AUTO: 2 %
ERYTHROCYTE [DISTWIDTH] IN BLOOD BY AUTOMATED COUNT: 12.5 % (ref 11.7–15.4)
FERRITIN SERPL-MCNC: 58 NG/ML (ref 15–150)
FOLATE SERPL-MCNC: 5.8 NG/ML
GLOBULIN SER CALC-MCNC: 2.8 G/DL (ref 1.5–4.5)
GLUCOSE SERPL-MCNC: 74 MG/DL (ref 70–99)
HBA1C MFR BLD: 5.1 % (ref 4.8–5.6)
HCT VFR BLD AUTO: 39.9 % (ref 34–46.6)
HDLC SERPL-MCNC: 56 MG/DL
HGB BLD-MCNC: 12.8 G/DL (ref 11.1–15.9)
IMM GRANULOCYTES # BLD AUTO: 0 X10E3/UL (ref 0–0.1)
IMM GRANULOCYTES NFR BLD AUTO: 0 %
IRON SATN MFR SERPL: 19 % (ref 15–55)
IRON SERPL-MCNC: 75 UG/DL (ref 27–159)
LDLC SERPL CALC-MCNC: 102 MG/DL (ref 0–99)
LYMPHOCYTES # BLD AUTO: 2.7 X10E3/UL (ref 0.7–3.1)
LYMPHOCYTES NFR BLD AUTO: 47 %
MCH RBC QN AUTO: 29.7 PG (ref 26.6–33)
MCHC RBC AUTO-ENTMCNC: 32.1 G/DL (ref 31.5–35.7)
MCV RBC AUTO: 93 FL (ref 79–97)
MONOCYTES # BLD AUTO: 0.3 X10E3/UL (ref 0.1–0.9)
MONOCYTES NFR BLD AUTO: 5 %
NEUTROPHILS # BLD AUTO: 2.6 X10E3/UL (ref 1.4–7)
NEUTROPHILS NFR BLD AUTO: 45 %
PLATELET # BLD AUTO: 373 X10E3/UL (ref 150–450)
POTASSIUM SERPL-SCNC: 4.2 MMOL/L (ref 3.5–5.2)
PROT SERPL-MCNC: 7 G/DL (ref 6–8.5)
RBC # BLD AUTO: 4.31 X10E6/UL (ref 3.77–5.28)
SODIUM SERPL-SCNC: 139 MMOL/L (ref 134–144)
T4 FREE SERPL-MCNC: 1.19 NG/DL (ref 0.82–1.77)
TIBC SERPL-MCNC: 403 UG/DL (ref 250–450)
TRIGL SERPL-MCNC: 109 MG/DL (ref 0–149)
TSH SERPL DL<=0.005 MIU/L-ACNC: 1.28 UIU/ML (ref 0.45–4.5)
UIBC SERPL-MCNC: 328 UG/DL (ref 131–425)
VIT B12 SERPL-MCNC: 573 PG/ML (ref 232–1245)
VLDLC SERPL CALC-MCNC: 20 MG/DL (ref 5–40)
WBC # BLD AUTO: 5.8 X10E3/UL (ref 3.4–10.8)

## 2024-10-18 RX ORDER — SEMAGLUTIDE 2.4 MG/.75ML
INJECTION, SOLUTION SUBCUTANEOUS
Qty: 3 ML | Refills: 3 | Status: SHIPPED | OUTPATIENT
Start: 2024-10-18

## 2024-10-18 RX ORDER — CHOLECALCIFEROL (VITAMIN D3) 25 MCG
1000 TABLET ORAL DAILY
Qty: 90 TABLET | Refills: 1 | Status: SHIPPED | OUTPATIENT
Start: 2024-10-18 | End: 2024-10-21

## 2024-10-19 LAB
APPEARANCE UR: CLEAR
BACTERIA #/AREA URNS HPF: ABNORMAL /[HPF]
BACTERIA UR CULT: ABNORMAL
BACTERIA UR CULT: ABNORMAL
BILIRUB UR QL STRIP: NEGATIVE
CASTS URNS QL MICRO: ABNORMAL /LPF
COLOR UR: YELLOW
EPI CELLS #/AREA URNS HPF: ABNORMAL /HPF (ref 0–10)
GLUCOSE UR QL STRIP: NEGATIVE
HGB UR QL STRIP: NEGATIVE
KETONES UR QL STRIP: NEGATIVE
LEUKOCYTE ESTERASE UR QL STRIP: NEGATIVE
MICRO URNS: NORMAL
MICRO URNS: NORMAL
NITRITE UR QL STRIP: NEGATIVE
OTHER ANTIBIOTIC SUSC ISLT: ABNORMAL
PH UR STRIP: 6 [PH] (ref 5–7.5)
PROT UR QL STRIP: NEGATIVE
RBC #/AREA URNS HPF: ABNORMAL /HPF (ref 0–2)
SP GR UR STRIP: 1.01 (ref 1–1.03)
URINALYSIS REFLEX: NORMAL
UROBILINOGEN UR STRIP-MCNC: 0.2 MG/DL (ref 0.2–1)
WBC #/AREA URNS HPF: ABNORMAL /HPF (ref 0–5)

## 2024-10-21 ENCOUNTER — TELEPHONE (OUTPATIENT)
Dept: FAMILY MEDICINE CLINIC | Facility: CLINIC | Age: 39
End: 2024-10-21
Payer: COMMERCIAL

## 2024-10-21 DIAGNOSIS — N30.00 ACUTE CYSTITIS WITHOUT HEMATURIA: Primary | ICD-10-CM

## 2024-10-21 RX ORDER — NITROFURANTOIN 25; 75 MG/1; MG/1
100 CAPSULE ORAL 2 TIMES DAILY
Qty: 10 CAPSULE | Refills: 0 | Status: SHIPPED | OUTPATIENT
Start: 2024-10-21 | End: 2024-10-26

## 2024-10-21 RX ORDER — CHOLECALCIFEROL (VITAMIN D3) 50 MCG
2000 TABLET ORAL DAILY
COMMUNITY

## 2024-10-21 NOTE — TELEPHONE ENCOUNTER
----- Message from Pop Rudd sent at 10/21/2024  8:27 AM EDT -----  Please advise patient that recently obtained lab testing indicates she has a urinary tract infection for which I will call her in an antibiotic called nitrofurantoin and she is to drink plenty fluids.  Her vitamin D level is slightly low for which she should increase her current vitamin D from 1000 IU daily up to 2000 IU daily, remainder of lab testing was satisfactory including cholesterol, B12, folic acid, iron studies, CBC, diabetic screen or hemoglobin A1c, chemistry profile and thyroid testing.  No changes recommended to her treatment regimen other than increasing vitamin D as noted.    I have called with no answer. I will call her again later. TF    I have spoke with her regarding her labs and she states she understands. TF

## 2024-11-26 ENCOUNTER — OFFICE VISIT (OUTPATIENT)
Dept: OBSTETRICS AND GYNECOLOGY | Facility: CLINIC | Age: 39
End: 2024-11-26
Payer: COMMERCIAL

## 2024-11-26 VITALS
HEIGHT: 71 IN | DIASTOLIC BLOOD PRESSURE: 78 MMHG | SYSTOLIC BLOOD PRESSURE: 128 MMHG | WEIGHT: 293 LBS | BODY MASS INDEX: 41.02 KG/M2

## 2024-11-26 DIAGNOSIS — N39.0 RECURRENT UTI: ICD-10-CM

## 2024-11-26 DIAGNOSIS — N93.9 ABNORMAL UTERINE BLEEDING (AUB): Primary | ICD-10-CM

## 2024-11-26 LAB
B-HCG UR QL: NEGATIVE
BILIRUB BLD-MCNC: NEGATIVE MG/DL
EXPIRATION DATE: NORMAL
GLUCOSE UR STRIP-MCNC: NEGATIVE MG/DL
INTERNAL NEGATIVE CONTROL: NEGATIVE
INTERNAL POSITIVE CONTROL: POSITIVE
KETONES UR QL: NEGATIVE
LEUKOCYTE EST, POC: NEGATIVE
Lab: NORMAL
NITRITE UR-MCNC: POSITIVE MG/ML
PROT UR STRIP-MCNC: NEGATIVE MG/DL
RBC # UR STRIP: ABNORMAL /UL
UROBILINOGEN UR QL: NORMAL

## 2024-11-26 RX ORDER — NITROFURANTOIN 25; 75 MG/1; MG/1
100 CAPSULE ORAL 2 TIMES DAILY
Qty: 14 CAPSULE | Refills: 0 | Status: SHIPPED | OUTPATIENT
Start: 2024-11-26 | End: 2024-12-03

## 2024-11-26 RX ORDER — NORETHINDRONE 5 MG/1
5 TABLET ORAL DAILY
Qty: 10 TABLET | Refills: 0 | Status: SHIPPED | OUTPATIENT
Start: 2024-11-26

## 2024-11-26 NOTE — PROGRESS NOTES
Endometrial Biopsy    Jailyn Jalloh is a 39 y.o. female,   , whose last menstrual period was Patient's last menstrual period was 2024..  The patient has a history of dysfunctional uterine bleeding and presents for an endometrial biopsy.  Patient reports vaginal bleeding.  She reports the bleeding as light. It has previously occurred daily.  Patient has been using none.  Patient also complains of pelvic pain.  .  After the indications, risks, benefits, and alternatives to performing and endometrial biopsy were explained to the patient, consent was signed. JAME Ross  .  A urine pregnancy test was negative.     PROCEDURE:  The patient was placed on the table in the supine lithotomy position.  She was draped in the appropriate manner.  A speculum was placed in the vagina.  The cervix was visualized and prepped with Betadine. A tenaculum was not used. A small plastic 5 mm Pipelle syringe curette was inserted into the cervical canal.  The uterus was sounded to 10 cms.  A vigorous four quadrant biopsy was performed, removing a small amount of tissue.  This tissue was placed in Formalin and sent to pathology.  The patient tolerated the procedure fairly well and reported moderate cramping.  She had Mild cramping at the time of discharge.  Physical Exam    Procedures    Review of Systems      Plan:  Orders Placed This Encounter   Procedures    Urine Culture - Urine, Urine, Clean Catch     Order Specific Question:   Release to patient     Answer:   Routine Release [[id number]]    Ambulatory Referral to Urology     Referral Priority:   Routine     Referral Type:   Consultation     Referral Reason:   Specialty Services Required     Requested Specialty:   Urology     Number of Visits Requested:   1    POC Pregnancy, Urine     Order Specific Question:   Release to patient     Answer:   Routine Release [[id number]]    POC Urinalysis Dipstick     Order Specific Question:   Release to patient      Answer:   Routine Release [[id number]]       Problem List Items Addressed This Visit    None  Visit Diagnoses       Abnormal uterine bleeding (AUB)    -  Primary    Relevant Orders    TISSUE EXAM, [facility] ([facility])    POC Pregnancy, Urine (Completed)    Urine Culture - Urine, Urine, Clean Catch    POC Urinalysis Dipstick (Completed)    Recurrent UTI        Relevant Orders    Ambulatory Referral to Urology                Instructions  Call the office in 5 business days for biopsy results.  Patient instructed to call the office if develops a fever of 100.4 or greater, vaginal bleeding heavier than a period, foul vaginal discharge or pain.      Rosie Stewart, APRN

## 2024-11-26 NOTE — PROGRESS NOTES
Chief Complaint   Patient presents with    Fibroids   Bleeding    Subjective   HPI  Jailyn Jalloh is a 39 y.o. female, . Her last LMP was Patient's last menstrual period was 2024.. who presents for evaluation for abnormal uterine bleeding. Patient states has bled for the last 13 days with clots and very heavy bleeding did not have a period last month at all. States that having severe pain during bleeding.  .      At her last visit she was treated with  none . Since then she reports her symptoms/issue has worsened . The patient reports additional symptoms as none.        Additional OB/GYN History     Last Pap :  negative    Last Completed Pap Smear       This patient has no relevant Health Maintenance data.            Last mammogram:   Last Completed Mammogram            Ordered - MAMMOGRAM (Every 2 Years) Ordered on 10/16/2024      2023  SCANNED - MAMMO                    Tobacco Usage?: No   OB History          2    Para   2    Term   2            AB        Living   2         SAB        IAB        Ectopic        Molar        Multiple        Live Births   2                  Current Outpatient Medications:     amLODIPine (NORVASC) 5 MG tablet, TAKE 1 TABLET BY MOUTH DAILY FOR BLOOD PRESSURE, Disp: 90 tablet, Rfl: 3    Cholecalciferol (Vitamin D) 50 MCG (2000 UT) tablet, Take 1 tablet by mouth Daily., Disp: , Rfl:     ferrous sulfate (FeroSul) 325 (65 FE) MG tablet, TAKE 1 TABLET BY MOUTH EVERY DAY, Disp: 90 tablet, Rfl: 2    ibuprofen (ADVIL,MOTRIN) 800 MG tablet, Take 1 tablet by mouth Every 6 (Six) Hours As Needed., Disp: , Rfl:     lisinopril-hydrochlorothiazide (PRINZIDE,ZESTORETIC) 20-12.5 MG per tablet, TAKE 1 TABLET BY MOUTH TWICE DAILY, Disp: 180 tablet, Rfl: 2    Semaglutide-Weight Management (Wegovy) 2.4 MG/0.75ML solution auto-injector, INJECT 2.4MG UNDER THE SKIN INTO THE APPROPRIATE AREA AS DIRECTED EVERY 7 DAYS, Disp: 3 mL, Rfl: 3    nitrofurantoin,  "macrocrystal-monohydrate, (Macrobid) 100 MG capsule, Take 1 capsule by mouth 2 (Two) Times a Day for 7 days., Disp: 14 capsule, Rfl: 0    norethindrone (Aygestin) 5 MG tablet, Take 1 tablet by mouth Daily., Disp: 10 tablet, Rfl: 0     Past Medical History:   Diagnosis Date    Gallstones     GERD (gastroesophageal reflux disease)     Hypertension     Iron deficiency anemia         Past Surgical History:   Procedure Laterality Date     SECTION       x2    GALLBLADDER SURGERY      10/21    LAPAROSCOPIC TUBAL LIGATION      TONSILLECTOMY      TUBAL ABDOMINAL LIGATION  2006       The additional following portions of the patient's history were reviewed and updated as appropriate: allergies and current medications.    Review of Systems   Constitutional: Negative.    Genitourinary:  Positive for menstrual problem and pelvic pain.       I have reviewed and agree with the HPI, ROS, and historical information as entered above. Rosie Stewart, APRN      Objective   /78   Ht 180.3 cm (71\")   Wt (!) 141 kg (310 lb)   LMP 2024   BMI 43.24 kg/m²     Physical Exam  Vitals and nursing note reviewed. Exam conducted with a chaperone present.   Constitutional:       Appearance: Normal appearance.   Pulmonary:      Effort: Pulmonary effort is normal.   Abdominal:      Palpations: Abdomen is soft.   Genitourinary:     Labia:         Right: No rash, tenderness or lesion.         Left: No rash, tenderness or lesion.       Vagina: Bleeding present. No lesions.      Cervix: No cervical motion tenderness, discharge, lesion or cervical bleeding.      Uterus: Normal. Not enlarged, not fixed and not tender.       Adnexa:         Right: No mass or tenderness.          Left: No mass or tenderness.        Comments: Chaperone Present  Neurological:      Mental Status: She is alert.         Assessment & Plan     Assessment     Problem List Items Addressed This Visit    None  Visit Diagnoses       Abnormal uterine " bleeding (AUB)    -  Primary    Relevant Orders    TISSUE EXAM, [facility] ([facility])    POC Pregnancy, Urine (Completed)    Urine Culture - Urine, Urine, Clean Catch    POC Urinalysis Dipstick (Completed)    Recurrent UTI        Relevant Orders    Ambulatory Referral to Urology            Recent labs reviewed from PCP and normal. This episode of bleeding has lasted 13 days so far. Recommended EMB today and she agreed. UPT negative, she has had tubal ligation. Declines STD screen. EMB was mostly blood with minimal tissue, may need EMB. She desires Dr. Mcgee.   Urine +UTI. She was treated last month for this as well and reports having frequent UTI. Will refer to urology.     Plan     EMB today  Aygestin x 10  Macrobid 100 mg bid x 7. Culture sent  Return in about 1 month (around 12/26/2024) for Annual physical.        Rosie Stewart, APRN  11/26/2024

## 2024-11-27 ENCOUNTER — TELEPHONE (OUTPATIENT)
Dept: FAMILY MEDICINE CLINIC | Facility: CLINIC | Age: 39
End: 2024-11-27
Payer: COMMERCIAL

## 2024-11-27 LAB — REF LAB TEST METHOD: NORMAL

## 2024-11-27 NOTE — TELEPHONE ENCOUNTER
I have spoke with her and have let her know that dr. Lord will not prescribe this on a daily basis. She will need to be seen when she is having the symptoms so that Dr. Lord will evaluate.   She states she is to busy at the moment to come in and will call when she needs to be seen. Her gyno doctor has prescribed her medication for 7 days for the UTI. TF

## 2024-11-27 NOTE — TELEPHONE ENCOUNTER
Caller: Jailyn Jalloh    Relationship: Self    Best call back number: [phone]    What is the best time to reach you: ANYTIME     Who are you requesting to speak with (clinical staff, provider,  specific staff member): CLINICAL STAFF     PATIENT IS WANTING TO SPEAK WITH EZRA ABOUT MEDICATION SHE IS NEEDING FOR FREQUENT UTIS. PLEASE CALL PATIENT TO DISCUSS.     PATIENT WAS ADVISED BY OBGYN THAT SHE NEEDS MICROBID DAILY AND NEEDED THAT FROM PCP.

## 2024-11-29 LAB
BACTERIA UR CULT: ABNORMAL
BACTERIA UR CULT: ABNORMAL
OTHER ANTIBIOTIC SUSC ISLT: ABNORMAL

## 2024-12-04 DIAGNOSIS — Z12.31 ENCOUNTER FOR SCREENING MAMMOGRAM FOR MALIGNANT NEOPLASM OF BREAST: ICD-10-CM

## 2024-12-05 ENCOUNTER — TELEPHONE (OUTPATIENT)
Dept: FAMILY MEDICINE CLINIC | Facility: CLINIC | Age: 39
End: 2024-12-05
Payer: COMMERCIAL

## 2024-12-05 NOTE — TELEPHONE ENCOUNTER
----- Message from Pop Rudd sent at 12/4/2024  2:38 PM EST -----  Please advise patient that her recent bilateral screening mammogram was normal, and that she is advised to obtain another screening mammogram in 1 year.    I have left her a vm regarding her mamm results. TF

## 2025-01-16 RX ORDER — LISINOPRIL AND HYDROCHLOROTHIAZIDE 12.5; 2 MG/1; MG/1
TABLET ORAL
Qty: 180 TABLET | Refills: 2 | Status: SHIPPED | OUTPATIENT
Start: 2025-01-16

## 2025-02-24 ENCOUNTER — TELEPHONE (OUTPATIENT)
Dept: UROLOGY | Facility: CLINIC | Age: 40
End: 2025-02-24
Payer: COMMERCIAL

## 2025-02-24 NOTE — TELEPHONE ENCOUNTER
"Relay     \"LVM # 1 FOR PT TO CALL BACK AND RESCHEDULE APPT WITH ZOILA LOMBARDI. HUB OKAY TO SCHEDULE. \"                  "

## 2025-07-15 DIAGNOSIS — I10 PRIMARY HYPERTENSION: ICD-10-CM

## 2025-07-15 RX ORDER — AMLODIPINE BESYLATE 5 MG/1
5 TABLET ORAL DAILY
Qty: 90 TABLET | Refills: 3 | Status: SHIPPED | OUTPATIENT
Start: 2025-07-15